# Patient Record
Sex: FEMALE | Race: WHITE | NOT HISPANIC OR LATINO | Employment: UNEMPLOYED | URBAN - METROPOLITAN AREA
[De-identification: names, ages, dates, MRNs, and addresses within clinical notes are randomized per-mention and may not be internally consistent; named-entity substitution may affect disease eponyms.]

---

## 2019-02-14 ENCOUNTER — OFFICE VISIT (OUTPATIENT)
Dept: URGENT CARE | Facility: CLINIC | Age: 34
End: 2019-02-14
Payer: COMMERCIAL

## 2019-02-14 VITALS
TEMPERATURE: 98.4 F | DIASTOLIC BLOOD PRESSURE: 80 MMHG | BODY MASS INDEX: 41.95 KG/M2 | HEART RATE: 80 BPM | SYSTOLIC BLOOD PRESSURE: 118 MMHG | RESPIRATION RATE: 20 BRPM | HEIGHT: 70 IN | WEIGHT: 293 LBS

## 2019-02-14 DIAGNOSIS — R51.9 ACUTE INTRACTABLE HEADACHE, UNSPECIFIED HEADACHE TYPE: Primary | ICD-10-CM

## 2019-02-14 PROCEDURE — 99203 OFFICE O/P NEW LOW 30 MIN: CPT | Performed by: FAMILY MEDICINE

## 2019-02-14 PROCEDURE — G0382 LEV 3 HOSP TYPE B ED VISIT: HCPCS | Performed by: FAMILY MEDICINE

## 2019-02-14 PROCEDURE — 99283 EMERGENCY DEPT VISIT LOW MDM: CPT | Performed by: FAMILY MEDICINE

## 2019-02-14 PROCEDURE — 96372 THER/PROPH/DIAG INJ SC/IM: CPT | Performed by: FAMILY MEDICINE

## 2019-02-14 RX ORDER — GABAPENTIN 600 MG/1
600 TABLET ORAL
Refills: 5 | COMMUNITY
Start: 2019-01-14

## 2019-02-14 RX ORDER — METOPROLOL TARTRATE 50 MG/1
50 TABLET, FILM COATED ORAL EVERY 12 HOURS SCHEDULED
Refills: 3 | COMMUNITY
Start: 2019-02-07

## 2019-02-14 RX ORDER — KETOROLAC TROMETHAMINE 30 MG/ML
60 INJECTION, SOLUTION INTRAMUSCULAR; INTRAVENOUS ONCE
Status: COMPLETED | OUTPATIENT
Start: 2019-02-14 | End: 2019-02-14

## 2019-02-14 RX ORDER — OXYCODONE HYDROCHLORIDE 10 MG/1
10 TABLET ORAL
Refills: 0 | COMMUNITY
Start: 2019-02-08

## 2019-02-14 RX ADMIN — KETOROLAC TROMETHAMINE 60 MG: 30 INJECTION, SOLUTION INTRAMUSCULAR; INTRAVENOUS at 20:15

## 2019-02-15 NOTE — PATIENT INSTRUCTIONS
If no improvement in symptoms, follow up with the emergency room  Follow up with PCP in 3-5 days  Proceed to  ER if symptoms worsen

## 2019-02-15 NOTE — PROGRESS NOTES
[facility] [place] [facility]        NAME: Judy Mulligan is a 27 y o  female  : 1985    MRN: [id number]  DATE: 2019  TIME: 8:10 PM    Assessment and Plan   Acute intractable headache, unspecified headache type [R51]  1  Acute intractable headache, unspecified headache type  ketorolac (TORADOL) injection 60 mg         Patient Instructions     If no improvement in symptoms, follow up with the emergency room  Follow up with PCP in 3-5 days  Proceed to  ER if symptoms worsen  Chief Complaint     Chief Complaint   Patient presents with    Migraine     for 1 day         History of Present Illness       Patient with a typical migraine symptoms  Usually resolved with ibuprofen and rest   This has been going on for 1 day she has had some nausea but no vomiting  Pain is localized to the right side behind the eye  This is not the worst headache the patient has had no life  Review of Systems   Review of Systems   Constitutional: Negative  Respiratory: Negative  Cardiovascular: Negative  Neurological: Positive for headaches  Current Medications       Current Outpatient Medications:     gabapentin (NEURONTIN) 600 MG tablet, , Disp: , Rfl: 5    metoprolol tartrate (LOPRESSOR) 50 mg tablet, , Disp: , Rfl: 3    oxyCODONE (ROXICODONE) 10 MG TABS, , Disp: , Rfl: 0    Current Facility-Administered Medications:     ketorolac (TORADOL) injection 60 mg, 60 mg, Intramuscular, Once, [employer], DO    Current Allergies     Allergies as of 2019    (No Known Allergies)            The following portions of the patient's history were reviewed and updated as appropriate: allergies, current medications, past family history, past medical history, past social history, past surgical history and problem list      History reviewed  No pertinent past medical history  History reviewed  No pertinent surgical history  History reviewed  No pertinent family history        Medications have been verified  Objective   /80   Pulse 80   Temp 98 4 °F (36 9 °C) (Tympanic)   Resp 20   Ht 5' 10" (1 778 m)   Wt (!) 163 kg (360 lb)   BMI 51 65 kg/m²        Physical Exam     Physical Exam   Constitutional: She is oriented to person, place, and time  She appears well-developed and well-nourished  Cardiovascular: Normal rate and regular rhythm  Pulmonary/Chest: Effort normal and breath sounds normal    Neurological: She is alert and oriented to person, place, and time  No cranial nerve deficit or sensory deficit  She exhibits normal muscle tone   Coordination normal

## 2019-02-17 ENCOUNTER — HOSPITAL ENCOUNTER (EMERGENCY)
Facility: HOSPITAL | Age: 34
Discharge: HOME | End: 2019-02-17
Attending: EMERGENCY MEDICINE | Admitting: EMERGENCY MEDICINE
Payer: COMMERCIAL

## 2019-02-17 ENCOUNTER — APPOINTMENT (EMERGENCY)
Dept: CT IMAGING | Facility: HOSPITAL | Age: 34
End: 2019-02-17
Payer: COMMERCIAL

## 2019-02-17 VITALS
SYSTOLIC BLOOD PRESSURE: 189 MMHG | TEMPERATURE: 98.6 F | WEIGHT: 293 LBS | OXYGEN SATURATION: 94 % | DIASTOLIC BLOOD PRESSURE: 104 MMHG | HEART RATE: 70 BPM | BODY MASS INDEX: 51.65 KG/M2 | RESPIRATION RATE: 16 BRPM

## 2019-02-17 DIAGNOSIS — H53.9 VISUAL DISTURBANCE: Primary | ICD-10-CM

## 2019-02-17 DIAGNOSIS — G43.809 OTHER MIGRAINE WITHOUT STATUS MIGRAINOSUS, NOT INTRACTABLE: ICD-10-CM

## 2019-02-17 LAB — EXT PREG TEST URINE: NEGATIVE

## 2019-02-17 PROCEDURE — 99284 EMERGENCY DEPT VISIT MOD MDM: CPT

## 2019-02-17 PROCEDURE — 70450 CT HEAD/BRAIN W/O DYE: CPT

## 2019-02-17 PROCEDURE — 81025 URINE PREGNANCY TEST: CPT | Performed by: EMERGENCY MEDICINE

## 2019-02-17 PROCEDURE — 96372 THER/PROPH/DIAG INJ SC/IM: CPT

## 2019-02-17 RX ORDER — ACETAMINOPHEN 325 MG/1
975 TABLET ORAL ONCE
Status: COMPLETED | OUTPATIENT
Start: 2019-02-17 | End: 2019-02-17

## 2019-02-17 RX ORDER — KETOROLAC TROMETHAMINE 30 MG/ML
30 INJECTION, SOLUTION INTRAMUSCULAR; INTRAVENOUS ONCE
Status: COMPLETED | OUTPATIENT
Start: 2019-02-17 | End: 2019-02-17

## 2019-02-17 RX ORDER — ONDANSETRON 4 MG/1
4 TABLET, ORALLY DISINTEGRATING ORAL ONCE
Status: COMPLETED | OUTPATIENT
Start: 2019-02-17 | End: 2019-02-17

## 2019-02-17 RX ADMIN — ACETAMINOPHEN 975 MG: 325 TABLET ORAL at 02:00

## 2019-02-17 RX ADMIN — METOPROLOL TARTRATE 50 MG: 25 TABLET ORAL at 01:12

## 2019-02-17 RX ADMIN — KETOROLAC TROMETHAMINE 30 MG: 30 INJECTION, SOLUTION INTRAMUSCULAR; INTRAVENOUS at 02:02

## 2019-02-17 RX ADMIN — ONDANSETRON 4 MG: 4 TABLET, ORALLY DISINTEGRATING ORAL at 02:02

## 2019-02-17 NOTE — ED PROVIDER NOTES
History  Chief Complaint   Patient presents with    Visual Field Change     migraine     76-year-old female with history of hypertension and herniated disc with drop foot presents for evaluation of migraine headache and visual disturbance x4 days  Patient reports headache which is bifrontal nonradiating and currently mild in nature  Patient seen in urgent care and instructed to follow up as an outpatient if continued symptoms  Patient reports intermittent headache and continued photophobia and mild visual disturbance  Patient denies fevers, chills, neck pain, abdominal pain, paresthesias, focal neurologic deficits and slurred speech  History provided by:  Patient   used: No        Prior to Admission Medications   Prescriptions Last Dose Informant Patient Reported? Taking?   gabapentin (NEURONTIN) 600 MG tablet   Yes No   Si mg daily at bedtime    metoprolol tartrate (LOPRESSOR) 50 mg tablet   Yes No   Si mg every 12 (twelve) hours    oxyCODONE (ROXICODONE) 10 MG TABS   Yes No   Sig: Take 10 mg by mouth daily at bedtime       Facility-Administered Medications: None       History reviewed  No pertinent past medical history  Past Surgical History:   Procedure Laterality Date    HIP SURGERY         History reviewed  No pertinent family history  I have reviewed and agree with the history as documented  Social History     Tobacco Use    Smoking status: Current Every Day Smoker     Packs/day: 1 00    Smokeless tobacco: Never Used   Substance Use Topics    Alcohol use: Never     Frequency: Never    Drug use: Yes     Types: Marijuana        Review of Systems   Constitutional: Negative for chills and fever  HENT: Negative for rhinorrhea and sore throat  Eyes: Positive for photophobia and visual disturbance  Respiratory: Negative for cough and shortness of breath  Cardiovascular: Negative for chest pain and leg swelling     Gastrointestinal: Negative for abdominal pain, diarrhea, nausea and vomiting  Genitourinary: Negative for dysuria  Musculoskeletal: Negative for back pain and myalgias  Skin: Negative for rash  Neurological: Negative for dizziness and headaches  Psychiatric/Behavioral: Negative for confusion  All other systems reviewed and are negative  Physical Exam  Physical Exam   Constitutional: She is oriented to person, place, and time  She appears well-developed and well-nourished  HENT:   Nose: Nose normal    Mouth/Throat: Oropharynx is clear and moist  No oropharyngeal exudate  Eyes: Pupils are equal, round, and reactive to light  Conjunctivae and EOM are normal  Right eye exhibits no chemosis and no discharge  Left eye exhibits no chemosis and no discharge  No scleral icterus  Neck: Normal range of motion  Neck supple  No JVD present  No tracheal deviation present  Cardiovascular: Normal rate, regular rhythm and normal heart sounds  No murmur heard  Pulmonary/Chest: Effort normal and breath sounds normal  No respiratory distress  She has no wheezes  She has no rales  Abdominal: Soft  Bowel sounds are normal  There is no tenderness  There is no guarding  Musculoskeletal: Normal range of motion  She exhibits no edema or tenderness  Neurological: She is alert and oriented to person, place, and time  No cranial nerve deficit or sensory deficit  She exhibits normal muscle tone  5/5 motor, nl sens   Skin: Skin is warm and dry  Psychiatric: She has a normal mood and affect  Her behavior is normal    Nursing note and vitals reviewed        Vital Signs  ED Triage Vitals [02/17/19 0019]   Temperature Pulse Respirations Blood Pressure SpO2   98 6 °F (37 °C) 77 18 (!) 190/109 94 %      Temp src Heart Rate Source Patient Position - Orthostatic VS BP Location FiO2 (%)   -- -- -- -- --      Pain Score       No Pain           Vitals:    02/17/19 0019 02/17/19 0136   BP: (!) 190/109 (!) 189/104   Pulse: 77 70       Visual Acuity  Visual Acuity Most Recent Value   Visual acuity R eye is  20/25   Visual acuity Left eye is  20/20   Visual acuity in both eyes is  20/20   Wearing corrective eyewear/lenses? Yes   L Pupil Size (mm)  3   R Pupil Size (mm)  3          ED Medications  Medications   metoprolol tartrate (LOPRESSOR) tablet 50 mg (50 mg Oral Given 2/17/19 0112)   ketorolac (TORADOL) injection 30 mg (30 mg Intramuscular Given 2/17/19 0202)   ondansetron (ZOFRAN-ODT) dispersible tablet 4 mg (4 mg Oral Given 2/17/19 0202)   acetaminophen (TYLENOL) tablet 975 mg (975 mg Oral Given 2/17/19 0200)       Diagnostic Studies  Results Reviewed     Procedure Component Value Units Date/Time    POCT pregnancy, urine [[id number]]  (Normal) Resulted:  02/17/19 0107    Lab Status:  Final result Updated:  02/17/19 0107     EXT PREG TEST UR (Ref: Negative) negative                 CT head without contrast   Final Result by Madiha Barrera DO (02/17 0132)      No acute intracranial abnormality  Workstation performed: [id number]                    Procedures  Procedures       Phone Contacts  ED Phone Contact    ED Course  ED Course as of Feb 17 0225   Brandie Ortega Feb 17, 2019   9724 Patient seen examined at bedside  CT head ordered      0112 Patient given home dose of metoprolol tartrate 50 mg        0132 Imaging reviewed, no acute pathology noted  Discussed with patient results of imaging and plan for Toradol, Tylenol and Zofran  0216 Visual acuity with 20/20 vision left eye and 20/25 vision right eye  Discussed with patient results of imaging and plan for discharge home  Patient will need to follow up with PMD as an outpatient and Ophthalmology  Patient will likely need outpatient MRI to be ordered by PMD   Patient to return to ED if any change or worsening condition              MDM    Disposition  Final diagnoses:   Visual disturbance   Other migraine without status migrainosus, not intractable     Time reflects when diagnosis was documented in both MDM as applicable and the Disposition within this note     Time User Action Codes Description Comment    2/17/2019  2:15 AM Magy Ashton Add [H53 9] Visual disturbance     2/17/2019  2:15 AM Cyndie Mcgill Add [G43 809] Other migraine without status migrainosus, not intractable       ED Disposition     ED Disposition Condition Date/Time Comment    Discharge Stable Sun Feb 17, 2019  2:15 AM Freya Alvarez discharge to home/self care  Follow-up Information     Follow up With Specialties Details Why Contact Info    SG Barfield Nurse Practitioner In 2 days  [address] [facility] [address]  Emergency Department Emergency Medicine  As needed, If symptoms worsen [address] [facility] [address] [phone]          Discharge Medication List as of 2/17/2019  2:16 AM      CONTINUE these medications which have NOT CHANGED    Details   gabapentin (NEURONTIN) 600 MG tablet 600 mg daily at bedtime , Starting Mon 1/14/2019, Historical Med      metoprolol tartrate (LOPRESSOR) 50 mg tablet 50 mg every 12 (twelve) hours , Starting Thu 2/7/2019, Historical Med      oxyCODONE (ROXICODONE) 10 MG TABS Take 10 mg by mouth daily at bedtime , Starting Fri 2/8/2019, Historical Med           No discharge procedures on file      ED Provider  Electronically Signed by           Hiro Gallegos DO  02/17/19 6992

## 2019-02-17 NOTE — ED NOTES
Pt  Seen at [facility] for migraine, has been having episodes of visual clarity in flashes       Donnie Penaloza RN  06/21/36 9868

## 2021-11-22 ENCOUNTER — HOSPITAL ENCOUNTER (EMERGENCY)
Facility: HOSPITAL | Age: 36
Discharge: HOME | End: 2021-11-22
Attending: EMERGENCY MEDICINE
Payer: MEDICARE

## 2021-11-22 ENCOUNTER — APPOINTMENT (EMERGENCY)
Dept: RADIOLOGY | Facility: HOSPITAL | Age: 36
End: 2021-11-22
Payer: MEDICARE

## 2021-11-22 VITALS
SYSTOLIC BLOOD PRESSURE: 196 MMHG | RESPIRATION RATE: 16 BRPM | HEIGHT: 70 IN | WEIGHT: 293 LBS | HEART RATE: 85 BPM | BODY MASS INDEX: 41.95 KG/M2 | DIASTOLIC BLOOD PRESSURE: 106 MMHG | TEMPERATURE: 98.4 F | OXYGEN SATURATION: 100 %

## 2021-11-22 DIAGNOSIS — S82.401A CLOSED FRACTURE OF BOTH FIBULAE, INITIAL ENCOUNTER: Primary | ICD-10-CM

## 2021-11-22 DIAGNOSIS — S82.402A CLOSED FRACTURE OF BOTH FIBULAE, INITIAL ENCOUNTER: Primary | ICD-10-CM

## 2021-11-22 PROCEDURE — 29515 APPLICATION SHORT LEG SPLINT: CPT | Performed by: EMERGENCY MEDICINE

## 2021-11-22 PROCEDURE — 73610 X-RAY EXAM OF ANKLE: CPT

## 2021-11-22 PROCEDURE — 99283 EMERGENCY DEPT VISIT LOW MDM: CPT

## 2021-11-22 PROCEDURE — 99285 EMERGENCY DEPT VISIT HI MDM: CPT | Performed by: EMERGENCY MEDICINE

## 2021-11-22 PROCEDURE — 96372 THER/PROPH/DIAG INJ SC/IM: CPT

## 2021-11-22 RX ORDER — HYDROMORPHONE HCL/PF 1 MG/ML
2 SYRINGE (ML) INJECTION ONCE
Status: COMPLETED | OUTPATIENT
Start: 2021-11-22 | End: 2021-11-22

## 2021-11-22 RX ORDER — OXYCODONE AND ACETAMINOPHEN 10; 325 MG/1; MG/1
1 TABLET ORAL EVERY 4 HOURS PRN
Qty: 8 TABLET | Refills: 0 | Status: SHIPPED | OUTPATIENT
Start: 2021-11-22

## 2021-11-22 RX ADMIN — HYDROMORPHONE HYDROCHLORIDE 2 MG: 1 INJECTION, SOLUTION INTRAMUSCULAR; INTRAVENOUS; SUBCUTANEOUS at 05:07

## 2021-12-07 ENCOUNTER — APPOINTMENT (OUTPATIENT)
Dept: LAB | Facility: HOSPITAL | Age: 36
End: 2021-12-07
Payer: MEDICARE

## 2021-12-07 DIAGNOSIS — I10 HYPERTENSION, UNSPECIFIED TYPE: ICD-10-CM

## 2021-12-07 DIAGNOSIS — E66.01 MORBID OBESITY (HCC): ICD-10-CM

## 2021-12-07 DIAGNOSIS — R53.83 FATIGUE, UNSPECIFIED TYPE: ICD-10-CM

## 2021-12-07 LAB
ALBUMIN SERPL BCP-MCNC: 2.6 G/DL (ref 3.5–5)
ALP SERPL-CCNC: 128 U/L (ref 46–116)
ALT SERPL W P-5'-P-CCNC: 15 U/L (ref 12–78)
ANION GAP SERPL CALCULATED.3IONS-SCNC: 7 MMOL/L (ref 4–13)
AST SERPL W P-5'-P-CCNC: 10 U/L (ref 5–45)
BILIRUB SERPL-MCNC: 0.27 MG/DL (ref 0.2–1)
BUN SERPL-MCNC: 10 MG/DL (ref 5–25)
CALCIUM ALBUM COR SERPL-MCNC: 9.5 MG/DL (ref 8.3–10.1)
CALCIUM SERPL-MCNC: 8.4 MG/DL (ref 8.3–10.1)
CHLORIDE SERPL-SCNC: 102 MMOL/L (ref 100–108)
CHOLEST SERPL-MCNC: 191 MG/DL
CO2 SERPL-SCNC: 29 MMOL/L (ref 21–32)
CREAT SERPL-MCNC: 1.16 MG/DL (ref 0.6–1.3)
GFR SERPL CREATININE-BSD FRML MDRD: 60 ML/MIN/1.73SQ M
GLUCOSE P FAST SERPL-MCNC: 169 MG/DL (ref 65–99)
HDLC SERPL-MCNC: 28 MG/DL
LDLC SERPL CALC-MCNC: 142 MG/DL (ref 0–100)
NONHDLC SERPL-MCNC: 163 MG/DL
POTASSIUM SERPL-SCNC: 3.7 MMOL/L (ref 3.5–5.3)
PROT SERPL-MCNC: 7.5 G/DL (ref 6.4–8.2)
SODIUM SERPL-SCNC: 138 MMOL/L (ref 136–145)
TRIGL SERPL-MCNC: 106 MG/DL
TSH SERPL DL<=0.05 MIU/L-ACNC: 3.95 UIU/ML (ref 0.36–3.74)

## 2021-12-07 PROCEDURE — 80053 COMPREHEN METABOLIC PANEL: CPT

## 2021-12-07 PROCEDURE — 36415 COLL VENOUS BLD VENIPUNCTURE: CPT

## 2021-12-07 PROCEDURE — 84443 ASSAY THYROID STIM HORMONE: CPT

## 2021-12-07 PROCEDURE — 80061 LIPID PANEL: CPT

## 2021-12-07 PROCEDURE — 83036 HEMOGLOBIN GLYCOSYLATED A1C: CPT

## 2021-12-07 PROCEDURE — 85025 COMPLETE CBC W/AUTO DIFF WBC: CPT

## 2021-12-08 LAB
BASOPHILS # BLD AUTO: 0.09 THOUSANDS/ΜL (ref 0–0.1)
BASOPHILS NFR BLD AUTO: 1 % (ref 0–1)
EOSINOPHIL # BLD AUTO: 0.53 THOUSAND/ΜL (ref 0–0.61)
EOSINOPHIL NFR BLD AUTO: 4 % (ref 0–6)
ERYTHROCYTE [DISTWIDTH] IN BLOOD BY AUTOMATED COUNT: 13.5 % (ref 11.6–15.1)
EST. AVERAGE GLUCOSE BLD GHB EST-MCNC: 183 MG/DL
HBA1C MFR BLD: 8 %
HCT VFR BLD AUTO: 50.5 % (ref 34.8–46.1)
HGB BLD-MCNC: 15 G/DL (ref 11.5–15.4)
IMM GRANULOCYTES # BLD AUTO: 0.04 THOUSAND/UL (ref 0–0.2)
IMM GRANULOCYTES NFR BLD AUTO: 0 % (ref 0–2)
LYMPHOCYTES # BLD AUTO: 2.87 THOUSANDS/ΜL (ref 0.6–4.47)
LYMPHOCYTES NFR BLD AUTO: 24 % (ref 14–44)
MCH RBC QN AUTO: 28.1 PG (ref 26.8–34.3)
MCHC RBC AUTO-ENTMCNC: 29.7 G/DL (ref 31.4–37.4)
MCV RBC AUTO: 95 FL (ref 82–98)
MONOCYTES # BLD AUTO: 0.45 THOUSAND/ΜL (ref 0.17–1.22)
MONOCYTES NFR BLD AUTO: 4 % (ref 4–12)
NEUTROPHILS # BLD AUTO: 8.19 THOUSANDS/ΜL (ref 1.85–7.62)
NEUTS SEG NFR BLD AUTO: 67 % (ref 43–75)
NRBC BLD AUTO-RTO: 0 /100 WBCS
PLATELET # BLD AUTO: 485 THOUSANDS/UL (ref 149–390)
PMV BLD AUTO: 11.2 FL (ref 8.9–12.7)
RBC # BLD AUTO: 5.34 MILLION/UL (ref 3.81–5.12)
WBC # BLD AUTO: 12.17 THOUSAND/UL (ref 4.31–10.16)

## 2022-03-18 ENCOUNTER — APPOINTMENT (OUTPATIENT)
Dept: LAB | Facility: HOSPITAL | Age: 37
End: 2022-03-18
Payer: MEDICARE

## 2022-03-18 DIAGNOSIS — I51.9 MYXEDEMA HEART DISEASE: ICD-10-CM

## 2022-03-18 DIAGNOSIS — I10 HYPERTENSION, UNSPECIFIED TYPE: ICD-10-CM

## 2022-03-18 DIAGNOSIS — E03.9 MYXEDEMA HEART DISEASE: ICD-10-CM

## 2022-03-18 LAB
ALBUMIN SERPL BCP-MCNC: 3.1 G/DL (ref 3.5–5)
ALP SERPL-CCNC: 119 U/L (ref 46–116)
ALT SERPL W P-5'-P-CCNC: 23 U/L (ref 12–78)
ANION GAP SERPL CALCULATED.3IONS-SCNC: 4 MMOL/L (ref 4–13)
AST SERPL W P-5'-P-CCNC: 10 U/L (ref 5–45)
BILIRUB SERPL-MCNC: 0.32 MG/DL (ref 0.2–1)
BUN SERPL-MCNC: 13 MG/DL (ref 5–25)
CALCIUM ALBUM COR SERPL-MCNC: 9.4 MG/DL (ref 8.3–10.1)
CALCIUM SERPL-MCNC: 8.7 MG/DL (ref 8.3–10.1)
CHLORIDE SERPL-SCNC: 106 MMOL/L (ref 100–108)
CO2 SERPL-SCNC: 27 MMOL/L (ref 21–32)
CREAT SERPL-MCNC: 1.1 MG/DL (ref 0.6–1.3)
EST. AVERAGE GLUCOSE BLD GHB EST-MCNC: 157 MG/DL
GFR SERPL CREATININE-BSD FRML MDRD: 63 ML/MIN/1.73SQ M
GLUCOSE P FAST SERPL-MCNC: 134 MG/DL (ref 65–99)
HBA1C MFR BLD: 7.1 %
LDLC SERPL DIRECT ASSAY-MCNC: 176 MG/DL (ref 0–100)
POTASSIUM SERPL-SCNC: 4.5 MMOL/L (ref 3.5–5.3)
PROT SERPL-MCNC: 7.4 G/DL (ref 6.4–8.2)
SODIUM SERPL-SCNC: 137 MMOL/L (ref 136–145)
TSH SERPL DL<=0.05 MIU/L-ACNC: 5.32 UIU/ML (ref 0.36–3.74)

## 2022-03-18 PROCEDURE — 83036 HEMOGLOBIN GLYCOSYLATED A1C: CPT

## 2022-03-18 PROCEDURE — 83721 ASSAY OF BLOOD LIPOPROTEIN: CPT

## 2022-03-18 PROCEDURE — 84443 ASSAY THYROID STIM HORMONE: CPT

## 2022-03-18 PROCEDURE — 80053 COMPREHEN METABOLIC PANEL: CPT

## 2022-03-18 PROCEDURE — 36415 COLL VENOUS BLD VENIPUNCTURE: CPT

## 2023-02-14 ENCOUNTER — OFFICE VISIT (OUTPATIENT)
Dept: FAMILY MEDICINE CLINIC | Facility: CLINIC | Age: 38
End: 2023-02-14

## 2023-02-14 VITALS
OXYGEN SATURATION: 92 % | HEART RATE: 115 BPM | WEIGHT: 293 LBS | BODY MASS INDEX: 41.95 KG/M2 | HEIGHT: 70 IN | DIASTOLIC BLOOD PRESSURE: 72 MMHG | TEMPERATURE: 98.2 F | SYSTOLIC BLOOD PRESSURE: 90 MMHG

## 2023-02-14 DIAGNOSIS — Z23 ENCOUNTER FOR IMMUNIZATION: ICD-10-CM

## 2023-02-14 DIAGNOSIS — I73.9 ARTERIAL INSUFFICIENCY OF LOWER EXTREMITY (HCC): ICD-10-CM

## 2023-02-14 DIAGNOSIS — F12.90 MARIJUANA USE: ICD-10-CM

## 2023-02-14 DIAGNOSIS — Z79.4 TYPE 2 DIABETES MELLITUS WITH DIABETIC NEUROPATHY, WITH LONG-TERM CURRENT USE OF INSULIN (HCC): ICD-10-CM

## 2023-02-14 DIAGNOSIS — I10 PRIMARY HYPERTENSION: ICD-10-CM

## 2023-02-14 DIAGNOSIS — L02.212 ABSCESS OF BACK: ICD-10-CM

## 2023-02-14 DIAGNOSIS — Z11.4 SCREENING FOR HIV (HUMAN IMMUNODEFICIENCY VIRUS): ICD-10-CM

## 2023-02-14 DIAGNOSIS — E03.9 HYPOTHYROIDISM, UNSPECIFIED TYPE: ICD-10-CM

## 2023-02-14 DIAGNOSIS — E66.01 CLASS 3 SEVERE OBESITY DUE TO EXCESS CALORIES WITH SERIOUS COMORBIDITY AND BODY MASS INDEX (BMI) OF 50.0 TO 59.9 IN ADULT (HCC): ICD-10-CM

## 2023-02-14 DIAGNOSIS — N18.4 STAGE 4 CHRONIC KIDNEY DISEASE (HCC): ICD-10-CM

## 2023-02-14 DIAGNOSIS — E11.40 TYPE 2 DIABETES MELLITUS WITH DIABETIC NEUROPATHY, WITH LONG-TERM CURRENT USE OF INSULIN (HCC): ICD-10-CM

## 2023-02-14 DIAGNOSIS — Z11.59 NEED FOR HEPATITIS C SCREENING TEST: ICD-10-CM

## 2023-02-14 DIAGNOSIS — E78.00 PURE HYPERCHOLESTEROLEMIA: ICD-10-CM

## 2023-02-14 DIAGNOSIS — Z76.89 ENCOUNTER TO ESTABLISH CARE WITH NEW DOCTOR: Primary | ICD-10-CM

## 2023-02-14 RX ORDER — INSULIN GLARGINE 100 [IU]/ML
30 INJECTION, SOLUTION SUBCUTANEOUS DAILY
Qty: 9 ML | Refills: 0 | Status: SHIPPED | OUTPATIENT
Start: 2023-02-14 | End: 2023-03-16

## 2023-02-14 RX ORDER — ISOPROPYL ALCOHOL 70 ML/100ML
SWAB TOPICAL
COMMUNITY
Start: 2023-01-10

## 2023-02-14 RX ORDER — METFORMIN HYDROCHLORIDE 500 MG/1
1000 TABLET, EXTENDED RELEASE ORAL
COMMUNITY
Start: 2022-12-29 | End: 2023-02-18

## 2023-02-14 RX ORDER — INSULIN GLARGINE 100 [IU]/ML
30 INJECTION, SOLUTION SUBCUTANEOUS DAILY
COMMUNITY
Start: 2022-12-08 | End: 2023-02-14 | Stop reason: SDUPTHER

## 2023-02-14 RX ORDER — VALSARTAN AND HYDROCHLOROTHIAZIDE 320; 25 MG/1; MG/1
TABLET, FILM COATED ORAL
COMMUNITY
Start: 2022-12-29 | End: 2023-02-18

## 2023-02-14 RX ORDER — AMLODIPINE BESYLATE 2.5 MG/1
7.5 TABLET ORAL DAILY
Qty: 90 TABLET | Refills: 0 | Status: SHIPPED | OUTPATIENT
Start: 2023-02-14 | End: 2023-02-18

## 2023-02-14 RX ORDER — AMLODIPINE BESYLATE 10 MG/1
TABLET ORAL
COMMUNITY
Start: 2023-01-06 | End: 2023-02-14

## 2023-02-14 RX ORDER — INSULIN ASPART 100 [IU]/ML
INJECTION, SOLUTION INTRAVENOUS; SUBCUTANEOUS
COMMUNITY
Start: 2023-01-27

## 2023-02-14 RX ORDER — CLINDAMYCIN HYDROCHLORIDE 300 MG/1
300 CAPSULE ORAL 4 TIMES DAILY
Qty: 28 CAPSULE | Refills: 0 | Status: SHIPPED | OUTPATIENT
Start: 2023-02-14 | End: 2023-02-18

## 2023-02-14 NOTE — PROGRESS NOTES
Assessment/Plan:      Diagnoses and all orders for this visit:    Encounter to establish care with new doctor    Need for hepatitis C screening test  -     Hepatitis C Antibody ([facility]); Future    Encounter for immunization  -     Cancel: Pneumococcal Conjugate Vaccine 20-valent (PCV20)  -     Cancel: influenza vaccine, quadrivalent, 0 5 mL, preservative-free, for adult and pediatric patients 6 mos+ (AFLURIA, FLUARIX, FLULAVAL, FLUZONE)    Screening for HIV (human immunodeficiency virus)  -     HIV 1/2 AG/AB w Reflex [facility] for 2 yr old and above; Future    Stage 4 chronic kidney disease (HCC)  -     Comprehensive metabolic panel; Future    Pure hypercholesterolemia  -     Lipid panel; Future    Type 2 diabetes mellitus with diabetic neuropathy, with long-term current use of insulin (HCC)  -     Comprehensive metabolic panel; Future  -     Lipid panel; Future  -     Insulin Glargine Solostar (Lantus SoloStar) 100 UNIT/ML SOPN; Inject 0 3 mL (30 Units total) under the skin daily    Hypothyroidism, unspecified type  -     TSH, 3rd generation with Free T4 reflex; Future    Primary hypertension  -     amLODIPine (NORVASC) 2 5 mg tablet; Take 3 tablets (7 5 mg total) by mouth daily    Arterial insufficiency of lower extremity (HCC)    Class 3 severe obesity due to excess calories with serious comorbidity and body mass index (BMI) of 50 0 to 59 9 in adult [facility])    Abscess of back  -     CBC and differential; Future  -     Ambulatory Referral to General Surgery; Future  -     clindamycin (CLEOCIN) 300 MG capsule;  Take 1 capsule (300 mg total) by mouth 4 (four) times a day for 7 days    Marijuana use    Other orders  -     glucose blood test strip; e11 65 (Type 2 Diabetes) Test daily before meals 3 times a day  -     GNP Alcohol Swabs 70 % PADS  -     Discontinue: amLODIPine (NORVASC) 10 mg tablet  -     Discontinue: Insulin Glargine Solostar (Lantus SoloStar) 100 UNIT/ML SOPN; Inject 30 Units under the skin daily  -     insulin aspart (NovoLOG FlexPen ReliOn) 100 UNIT/ML injection pen; TAKE EXTRA INSULIN BASED ON BG RESULTS PER SCALE PROVIDED (ISF 10-80 MG/DL OPTIONS)  E11 65 (TYPE 2 DIABETES) -200 = 1 UNIT HUMALOG -250 =<MORE>  -     metFORMIN (GLUCOPHAGE-XR) 500 mg 24 hr tablet; Take 1,000 mg by mouth  -     valsartan-hydrochlorothiazide (DIOVAN-HCT) 320-25 MG per tablet          Return in about 1 week (around 2/21/2023) for follow up back  The following portions of the patient's history were reviewed and updated as appropriate: allergies, current medications, past family history, past medical history, past social history, past surgical history, and problem list      Subjective:     Patient ID: Xavier Hearn is a 43 y o  female  HPI    Xavier Hearn presents today to establish care  Patient doing not well today  History was reviewed as below  Previous PCP with [facility]  Has not been seen for 1 year approximately  Last visit 4/19/2022 for HTN  T2DM: patient previously was only on metformin 500 x 2 (1000 mg) daily  12/6/2022 she was hospitalized and started on insulin with referral to endocrinology for diabetes management  lantus 30 units once daily  humalog TID pending scale  scale references below  Patient has pending appointment endo 3/6/2023  Gabapentin 600 mg BID due to neuropathy  Reports has been out of the lantus for 1 month so has not been taking  Reports only takes 5-7 units per meal for the last month  Reports was 1-2 units only when she was on the lantus  She does log at home and have been 300-400       Blood glucose 151-200 = Humalog 1 unit  Blood glucose 201-250 = Humalog 2 units  Blood glucose 251-300 = Humalog 3 units   Blood glucose 301-350 = Humalog 5 units  Blood glucose 351-400 = Humalog 7 units    Lab Results   Component Value Date    HGBA1C 13 2 (H) 12/07/2022   - patient sent refill of lantus 30 units  - log and bring to next visit  - I advised patient I am worried about her glucose given her machine read "high" today which likely indicated > 500    HTN: amlodipine 2 5 mg x 3 tablets (7 5 mg) daily but reports then was increased to 10 mg in January due to high blood pressures via [facility] who called her pcp, metoprolol 75 mg BID, valsartan hydrochlorothiazide 320-25 mg daily  Does not check blood pressure cuff  - decrease norvasc back to 7 5 mg   - however, given that I think this patient may be septic vs hyperglycemic with low BP advised to HOLD the amlodipine even after receiving new dose  - get BP cuff which she says her mother can get for her and monitor daily  She is asymptomatic currently from the low BP but given remainder of symptoms she needs to be monitored closely and advised any worsening she needs to go into the ED immediately    Hypothyroidism: patient reportedly on levothyroxine 25 mcg daily  Not taking as directed  Lab Results   Component Value Date    RPV7ASYCBACE 5 320 (H) 03/18/2022   - repeat TSH now    Patient reports that she has a lesion on the back for a few days now that has been draining a lot of discharge  Her mom attempted to push out the drainage but it has continued to drain  She reports was hospitalized in December for similar lesion of the scalp  On chart review she was started on bactrim  She reports this worked well  Was following with general surgery but reports it is hard for her to get in with them  Also on chart review however last creatinine 12/2022 was 3 39  does not see nephrologist  GFR 17    - sent clindamycin to pharmacy  mg x 7 days to last until next visit to determine continued length of treatment as I am suspecting that the increased creatinine could have been result of the addition of bactrim and do not want to risk it especially not having been repeated for 2 months  - Repeat CMP now  If elevated referral to nephrology  - referal to general surgery with [facility] to see if she can get in sooner    - no fluctuance felt on examination today to I&D effectively  - concerned about sepsis given infection, low BP, elevated pulse, patient was profusely sweating, some of these may be related to her extreme glucose but she was advised to go to ED but did not want to do this at this moment  - she was advised any change in health she should go immediately there for sufficient work up and monitoring    Repeat cholesterol  Elevated last check  Not on statin  Tobacco dependence: 1 ppd x 20  interest but not ready  Marijuana continuous 1-2 times daily- advised against this and to not drive or operate machinery      Phone call made to patient 2/15/2023 2:47  Patient reports she is already feeling better  She completed her labs within the last hour  She has unfortunately not been able to  her lantus yet due to not being available but reports she was called this morning and told her they have it now so she was on her way to pick it up  She reports her sugar had come down to "400" today  She reports main concern currently is the muscle weakness  I again advised given the significant elevation is sugars I am worried her electrolytes may be off and advised if worsening to go to the ED for medication to improve sugars and possibly replete any electrolytes (K) that may be off   She verbalized understanding    PHQ-9 Depression Screening    Little interest or pleasure in doing things: 1 - several days  Feeling down, depressed, or hopeless: 1 - several days          Past Medical History:   Diagnosis Date   • Diabetes mellitus, type 2 ([facility] [address] )    • Hypertension        Past Surgical History:   Procedure Laterality Date   • HIP SURGERY Left     fracture- june 2008       Family History   Problem Relation Age of Onset   • Diabetes Mother    • Other Father         aortic insufficiency       Social History     Tobacco Use   • Smoking status: Every Day     Packs/day: 1 00     Years: 20 00     Pack years: 20 00     Types: Cigarettes   • Smokeless tobacco: Never   Substance Use Topics   • Alcohol use: Never   • Drug use: Yes     Types: Marijuana      Social History     Social History Narrative    Live house- with parents        Reports has disability due to drop foot- via [facility]       Allergies   Allergen Reactions   • Wasp Venom Anxiety, Headache, Itching, Rash and Swelling       Current Outpatient Medications on File Prior to Visit   Medication Sig Dispense Refill   • gabapentin (NEURONTIN) 600 MG tablet 600 mg daily at bedtime   5   • glucose blood test strip e11 65 (Type 2 Diabetes) Test daily before meals 3 times a day     • insulin aspart (NovoLOG FlexPen ReliOn) 100 UNIT/ML injection pen TAKE EXTRA INSULIN BASED ON BG RESULTS PER SCALE PROVIDED (ISF 10-80 MG/DL OPTIONS)  E11 65 (TYPE 2 DIABETES) -200 = 1 UNIT HUMALOG -250 =<MORE>     • metFORMIN (GLUCOPHAGE-XR) 500 mg 24 hr tablet Take 1,000 mg by mouth     • metoprolol tartrate (LOPRESSOR) 50 mg tablet 50 mg every 12 (twelve) hours   3   • GNP Alcohol Swabs 70 % PADS      • valsartan-hydrochlorothiazide (DIOVAN-HCT) 320-25 MG per tablet        No current facility-administered medications on file prior to visit  Review of Systems      Objective:    Vitals:    02/14/23 1409   BP: 90/72   Pulse: (!) 115   Temp: 98 2 °F (36 8 °C)   TempSrc: Tympanic   SpO2: 92%   Weight: (!) 170 kg (374 lb)   Height: 5' 10" (1 778 m)         Physical Exam  Vitals and nursing note reviewed  Constitutional:       General: She is not in acute distress  Appearance: Normal appearance  She is not ill-appearing or toxic-appearing  Comments: Profusely sweating   HENT:      Head: Normocephalic and atraumatic  Right Ear: External ear normal       Left Ear: External ear normal    Eyes:      General: No scleral icterus  Right eye: No discharge  Left eye: No discharge  Extraocular Movements: Extraocular movements intact        Conjunctiva/sclera: Conjunctivae normal    Cardiovascular:      Rate and Rhythm: Normal rate and regular rhythm  Heart sounds: Normal heart sounds  No murmur heard  No friction rub  No gallop  Pulmonary:      Effort: Pulmonary effort is normal  No respiratory distress  Breath sounds: Normal breath sounds  No wheezing or rales  Skin:     Findings: Lesion (erythema with induration of left upper back  mild warmth to touch  slightly tenderness to palpation  i am able to express yellow discharge from two lesions in the skin but no fluctuance was felt on examination) present  Neurological:      Mental Status: She is alert

## 2023-02-14 NOTE — PATIENT INSTRUCTIONS
Hold the norvasc despite the change in your medication dose back to 7 5 mg (three 2 5 mg tablets daily)  - get a blood pressure cuff and start checking daily at home and log with your sugars    Restart the lantus 30 units nightly  - continue the mealtime insulin with the scale you were given before    Continue to monitor your sugars    Get your blood tests done tomorrow morning fasting    Call the general surgery office you saw for the last infection and see if you can get in within the week    I placed a referral to [facility] general surgery so you ever you can get in with first

## 2023-02-15 ENCOUNTER — LAB (OUTPATIENT)
Dept: LAB | Facility: HOSPITAL | Age: 38
End: 2023-02-15

## 2023-02-15 DIAGNOSIS — E11.40 TYPE 2 DIABETES MELLITUS WITH DIABETIC NEUROPATHY, WITH LONG-TERM CURRENT USE OF INSULIN (HCC): ICD-10-CM

## 2023-02-15 DIAGNOSIS — Z79.4 TYPE 2 DIABETES MELLITUS WITH DIABETIC NEUROPATHY, WITH LONG-TERM CURRENT USE OF INSULIN (HCC): ICD-10-CM

## 2023-02-15 DIAGNOSIS — L02.212 ABSCESS OF BACK: ICD-10-CM

## 2023-02-15 DIAGNOSIS — Z11.59 NEED FOR HEPATITIS C SCREENING TEST: ICD-10-CM

## 2023-02-15 DIAGNOSIS — E78.00 PURE HYPERCHOLESTEROLEMIA: ICD-10-CM

## 2023-02-15 DIAGNOSIS — Z11.4 SCREENING FOR HIV (HUMAN IMMUNODEFICIENCY VIRUS): ICD-10-CM

## 2023-02-15 DIAGNOSIS — E03.9 HYPOTHYROIDISM, UNSPECIFIED TYPE: ICD-10-CM

## 2023-02-15 DIAGNOSIS — N18.4 STAGE 4 CHRONIC KIDNEY DISEASE (HCC): ICD-10-CM

## 2023-02-15 PROBLEM — I10 PRIMARY HYPERTENSION: Status: ACTIVE | Noted: 2023-02-15

## 2023-02-15 PROBLEM — F12.90 MARIJUANA USE: Status: ACTIVE | Noted: 2023-02-15

## 2023-02-15 LAB
ALBUMIN SERPL BCP-MCNC: 3 G/DL (ref 3.5–5)
ALP SERPL-CCNC: 149 U/L (ref 34–104)
ALT SERPL W P-5'-P-CCNC: 13 U/L (ref 7–52)
ANION GAP SERPL CALCULATED.3IONS-SCNC: 15 MMOL/L (ref 4–13)
AST SERPL W P-5'-P-CCNC: 9 U/L (ref 13–39)
BASOPHILS # BLD AUTO: 0.12 THOUSANDS/ÂΜL (ref 0–0.1)
BASOPHILS NFR BLD AUTO: 1 % (ref 0–1)
BILIRUB SERPL-MCNC: 0.68 MG/DL (ref 0.2–1)
BUN SERPL-MCNC: 32 MG/DL (ref 5–25)
CALCIUM ALBUM COR SERPL-MCNC: 9.5 MG/DL (ref 8.3–10.1)
CALCIUM SERPL-MCNC: 8.7 MG/DL (ref 8.4–10.2)
CHLORIDE SERPL-SCNC: 86 MMOL/L (ref 96–108)
CHOLEST SERPL-MCNC: 136 MG/DL
CO2 SERPL-SCNC: 27 MMOL/L (ref 21–32)
CREAT SERPL-MCNC: 3.12 MG/DL (ref 0.6–1.3)
EOSINOPHIL # BLD AUTO: 0.15 THOUSAND/ÂΜL (ref 0–0.61)
EOSINOPHIL NFR BLD AUTO: 1 % (ref 0–6)
ERYTHROCYTE [DISTWIDTH] IN BLOOD BY AUTOMATED COUNT: 13.3 % (ref 11.6–15.1)
GFR SERPL CREATININE-BSD FRML MDRD: 17 ML/MIN/1.73SQ M
GLUCOSE P FAST SERPL-MCNC: 453 MG/DL (ref 65–99)
HCT VFR BLD AUTO: 39.2 % (ref 34.8–46.1)
HCV AB SER QL: NORMAL
HDLC SERPL-MCNC: 15 MG/DL
HGB BLD-MCNC: 12.5 G/DL (ref 11.5–15.4)
IMM GRANULOCYTES # BLD AUTO: 0.32 THOUSAND/UL (ref 0–0.2)
IMM GRANULOCYTES NFR BLD AUTO: 1 % (ref 0–2)
LDLC SERPL CALC-MCNC: 81 MG/DL (ref 0–100)
LYMPHOCYTES # BLD AUTO: 1.1 THOUSANDS/ÂΜL (ref 0.6–4.47)
LYMPHOCYTES NFR BLD AUTO: 4 % (ref 14–44)
MCH RBC QN AUTO: 27.8 PG (ref 26.8–34.3)
MCHC RBC AUTO-ENTMCNC: 31.9 G/DL (ref 31.4–37.4)
MCV RBC AUTO: 87 FL (ref 82–98)
MONOCYTES # BLD AUTO: 1.69 THOUSAND/ÂΜL (ref 0.17–1.22)
MONOCYTES NFR BLD AUTO: 7 % (ref 4–12)
NEUTROPHILS # BLD AUTO: 22.73 THOUSANDS/ÂΜL (ref 1.85–7.62)
NEUTS SEG NFR BLD AUTO: 86 % (ref 43–75)
NONHDLC SERPL-MCNC: 121 MG/DL
NRBC BLD AUTO-RTO: 0 /100 WBCS
PLATELET # BLD AUTO: 421 THOUSANDS/UL (ref 149–390)
PMV BLD AUTO: 10.7 FL (ref 8.9–12.7)
POTASSIUM SERPL-SCNC: 4.2 MMOL/L (ref 3.5–5.3)
PROT SERPL-MCNC: 7.5 G/DL (ref 6.4–8.4)
RBC # BLD AUTO: 4.5 MILLION/UL (ref 3.81–5.12)
SODIUM SERPL-SCNC: 128 MMOL/L (ref 135–147)
TRIGL SERPL-MCNC: 199 MG/DL
TSH SERPL DL<=0.05 MIU/L-ACNC: 3.52 UIU/ML (ref 0.45–4.5)
WBC # BLD AUTO: 26.11 THOUSAND/UL (ref 4.31–10.16)

## 2023-02-16 LAB
HIV 1+2 AB+HIV1 P24 AG SERPL QL IA: NORMAL
HIV 2 AB SERPL QL IA: NORMAL
HIV1 AB SERPL QL IA: NORMAL
HIV1 P24 AG SERPL QL IA: NORMAL

## 2023-02-17 ENCOUNTER — APPOINTMENT (INPATIENT)
Dept: ULTRASOUND IMAGING | Facility: HOSPITAL | Age: 38
End: 2023-02-17

## 2023-02-17 ENCOUNTER — HOSPITAL ENCOUNTER (INPATIENT)
Facility: HOSPITAL | Age: 38
LOS: 1 days | Discharge: HOME HEALTH | End: 2023-02-18
Attending: EMERGENCY MEDICINE | Admitting: INTERNAL MEDICINE

## 2023-02-17 ENCOUNTER — APPOINTMENT (INPATIENT)
Dept: NON INVASIVE DIAGNOSTICS | Facility: HOSPITAL | Age: 38
End: 2023-02-17

## 2023-02-17 DIAGNOSIS — R65.20 SEVERE SEPSIS (HCC): ICD-10-CM

## 2023-02-17 DIAGNOSIS — N17.9 ACUTE KIDNEY INJURY (HCC): ICD-10-CM

## 2023-02-17 DIAGNOSIS — A41.9 SEPSIS (HCC): ICD-10-CM

## 2023-02-17 DIAGNOSIS — L03.90 CELLULITIS: ICD-10-CM

## 2023-02-17 DIAGNOSIS — N18.31 STAGE 3A CHRONIC KIDNEY DISEASE (HCC): Chronic | ICD-10-CM

## 2023-02-17 DIAGNOSIS — I73.9 ARTERIAL INSUFFICIENCY OF LOWER EXTREMITY (HCC): ICD-10-CM

## 2023-02-17 DIAGNOSIS — E66.9 OBESITY: ICD-10-CM

## 2023-02-17 DIAGNOSIS — N17.9 ACUTE RENAL FAILURE (ARF) (HCC): Primary | ICD-10-CM

## 2023-02-17 DIAGNOSIS — A41.9 SEVERE SEPSIS (HCC): ICD-10-CM

## 2023-02-17 DIAGNOSIS — L02.212 ABSCESS OF BACK: ICD-10-CM

## 2023-02-17 DIAGNOSIS — E11.65 UNCONTROLLED DIABETES MELLITUS WITH HYPERGLYCEMIA (HCC): ICD-10-CM

## 2023-02-17 DIAGNOSIS — E83.42 HYPOMAGNESEMIA: ICD-10-CM

## 2023-02-17 PROBLEM — I10 PRIMARY HYPERTENSION: Chronic | Status: ACTIVE | Noted: 2023-02-15

## 2023-02-17 PROBLEM — E66.01 CLASS 3 SEVERE OBESITY DUE TO EXCESS CALORIES WITH SERIOUS COMORBIDITY AND BODY MASS INDEX (BMI) OF 50.0 TO 59.9 IN ADULT (HCC): Chronic | Status: ACTIVE | Noted: 2023-02-14

## 2023-02-17 PROBLEM — E87.1 HYPONATREMIA: Status: ACTIVE | Noted: 2023-02-17

## 2023-02-17 PROBLEM — E11.40 TYPE 2 DIABETES MELLITUS WITH DIABETIC NEUROPATHY, WITH LONG-TERM CURRENT USE OF INSULIN (HCC): Chronic | Status: ACTIVE | Noted: 2023-02-15

## 2023-02-17 PROBLEM — Z79.4 TYPE 2 DIABETES MELLITUS WITH DIABETIC NEUROPATHY, WITH LONG-TERM CURRENT USE OF INSULIN (HCC): Chronic | Status: ACTIVE | Noted: 2023-02-15

## 2023-02-17 PROBLEM — E87.20 LACTIC ACIDOSIS: Status: ACTIVE | Noted: 2023-02-17

## 2023-02-17 PROBLEM — E03.9 HYPOTHYROIDISM: Chronic | Status: ACTIVE | Noted: 2023-02-15

## 2023-02-17 LAB
ALBUMIN SERPL BCP-MCNC: 2.7 G/DL (ref 3.5–5)
ALP SERPL-CCNC: 158 U/L (ref 34–104)
ALT SERPL W P-5'-P-CCNC: 16 U/L (ref 7–52)
ANION GAP SERPL CALCULATED.3IONS-SCNC: 12 MMOL/L (ref 4–13)
APTT PPP: 31 SECONDS (ref 23–37)
AST SERPL W P-5'-P-CCNC: 12 U/L (ref 13–39)
ATRIAL RATE: 74 BPM
BACTERIA UR QL AUTO: NORMAL /HPF
BASE EX.OXY STD BLDV CALC-SCNC: 77.9 % (ref 60–80)
BASE EXCESS BLDV CALC-SCNC: -2.1 MMOL/L
BASOPHILS # BLD AUTO: 0.09 THOUSANDS/ÂΜL (ref 0–0.1)
BASOPHILS NFR BLD AUTO: 1 % (ref 0–1)
BETA-HYDROXYBUTYRATE: 0.1 MMOL/L
BILIRUB SERPL-MCNC: 0.26 MG/DL (ref 0.2–1)
BILIRUB UR QL STRIP: NEGATIVE
BUN SERPL-MCNC: 45 MG/DL (ref 5–25)
CALCIUM ALBUM COR SERPL-MCNC: 8.8 MG/DL (ref 8.3–10.1)
CALCIUM SERPL-MCNC: 7.8 MG/DL (ref 8.4–10.2)
CHLORIDE SERPL-SCNC: 89 MMOL/L (ref 96–108)
CLARITY UR: CLEAR
CO2 SERPL-SCNC: 25 MMOL/L (ref 21–32)
COLOR UR: YELLOW
CREAT SERPL-MCNC: 3.41 MG/DL (ref 0.6–1.3)
CREAT UR-MCNC: 83 MG/DL
EOSINOPHIL # BLD AUTO: 0.25 THOUSAND/ÂΜL (ref 0–0.61)
EOSINOPHIL NFR BLD AUTO: 2 % (ref 0–6)
ERYTHROCYTE [DISTWIDTH] IN BLOOD BY AUTOMATED COUNT: 13.6 % (ref 11.6–15.1)
EXT PREGNANCY TEST URINE: NEGATIVE
EXT. CONTROL: NORMAL
GFR SERPL CREATININE-BSD FRML MDRD: 16 ML/MIN/1.73SQ M
GLUCOSE SERPL-MCNC: 221 MG/DL (ref 65–140)
GLUCOSE SERPL-MCNC: 275 MG/DL (ref 65–140)
GLUCOSE SERPL-MCNC: 317 MG/DL (ref 65–140)
GLUCOSE SERPL-MCNC: 357 MG/DL (ref 65–140)
GLUCOSE SERPL-MCNC: 362 MG/DL (ref 65–140)
GLUCOSE SERPL-MCNC: 389 MG/DL (ref 65–140)
GLUCOSE SERPL-MCNC: 519 MG/DL (ref 65–140)
GLUCOSE SERPL-MCNC: >500 MG/DL (ref 65–140)
GLUCOSE UR STRIP-MCNC: ABNORMAL MG/DL
HCO3 BLDV-SCNC: 24.7 MMOL/L (ref 24–30)
HCT VFR BLD AUTO: 34.1 % (ref 34.8–46.1)
HGB BLD-MCNC: 10.8 G/DL (ref 11.5–15.4)
HGB UR QL STRIP.AUTO: NEGATIVE
IMM GRANULOCYTES # BLD AUTO: 0.3 THOUSAND/UL (ref 0–0.2)
IMM GRANULOCYTES NFR BLD AUTO: 2 % (ref 0–2)
INR PPP: 0.99 (ref 0.84–1.19)
KETONES UR STRIP-MCNC: NEGATIVE MG/DL
LACTATE SERPL-SCNC: 2 MMOL/L (ref 0.5–2)
LACTATE SERPL-SCNC: 2.9 MMOL/L (ref 0.5–2)
LEUKOCYTE ESTERASE UR QL STRIP: NEGATIVE
LYMPHOCYTES # BLD AUTO: 2.5 THOUSANDS/ÂΜL (ref 0.6–4.47)
LYMPHOCYTES NFR BLD AUTO: 15 % (ref 14–44)
MAGNESIUM SERPL-MCNC: 1.5 MG/DL (ref 1.9–2.7)
MCH RBC QN AUTO: 27.9 PG (ref 26.8–34.3)
MCHC RBC AUTO-ENTMCNC: 31.7 G/DL (ref 31.4–37.4)
MCV RBC AUTO: 88 FL (ref 82–98)
MONOCYTES # BLD AUTO: 1.16 THOUSAND/ÂΜL (ref 0.17–1.22)
MONOCYTES NFR BLD AUTO: 7 % (ref 4–12)
NEUTROPHILS # BLD AUTO: 12.44 THOUSANDS/ÂΜL (ref 1.85–7.62)
NEUTS SEG NFR BLD AUTO: 73 % (ref 43–75)
NITRITE UR QL STRIP: NEGATIVE
NON-SQ EPI CELLS URNS QL MICRO: NORMAL /HPF
NRBC BLD AUTO-RTO: 0 /100 WBCS
O2 CT BLDV-SCNC: 13.6 ML/DL
P AXIS: 37 DEGREES
PCO2 BLDV: 51.1 MM HG (ref 42–50)
PH BLDV: 7.3 [PH] (ref 7.3–7.4)
PH UR STRIP.AUTO: 6 [PH]
PHOSPHATE SERPL-MCNC: 3.8 MG/DL (ref 2.7–4.5)
PLATELET # BLD AUTO: 446 THOUSANDS/UL (ref 149–390)
PMV BLD AUTO: 11 FL (ref 8.9–12.7)
PO2 BLDV: 50.7 MM HG (ref 35–45)
POTASSIUM SERPL-SCNC: 3.9 MMOL/L (ref 3.5–5.3)
PR INTERVAL: 194 MS
PROCALCITONIN SERPL-MCNC: 1.16 NG/ML
PROT SERPL-MCNC: 6.7 G/DL (ref 6.4–8.4)
PROT UR STRIP-MCNC: ABNORMAL MG/DL
PROT UR-MCNC: 80 MG/DL
PROT/CREAT UR: 0.96 MG/G{CREAT} (ref 0–0.1)
PROTHROMBIN TIME: 13.1 SECONDS (ref 11.6–14.5)
QRS AXIS: -11 DEGREES
QRSD INTERVAL: 70 MS
QT INTERVAL: 414 MS
QTC INTERVAL: 459 MS
RBC # BLD AUTO: 3.87 MILLION/UL (ref 3.81–5.12)
RBC #/AREA URNS AUTO: NORMAL /HPF
SODIUM SERPL-SCNC: 126 MMOL/L (ref 135–147)
SP GR UR STRIP.AUTO: 1.02
T WAVE AXIS: 37 DEGREES
UROBILINOGEN UR QL STRIP.AUTO: 1 E.U./DL
VENTRICULAR RATE: 74 BPM
WBC # BLD AUTO: 16.74 THOUSAND/UL (ref 4.31–10.16)
WBC #/AREA URNS AUTO: NORMAL /HPF

## 2023-02-17 PROCEDURE — 0J970ZZ DRAINAGE OF BACK SUBCUTANEOUS TISSUE AND FASCIA, OPEN APPROACH: ICD-10-PCS | Performed by: SURGERY

## 2023-02-17 RX ORDER — ONDANSETRON 2 MG/ML
4 INJECTION INTRAMUSCULAR; INTRAVENOUS EVERY 6 HOURS PRN
Status: DISCONTINUED | OUTPATIENT
Start: 2023-02-17 | End: 2023-02-18 | Stop reason: HOSPADM

## 2023-02-17 RX ORDER — DOCUSATE SODIUM 100 MG/1
100 CAPSULE, LIQUID FILLED ORAL 2 TIMES DAILY
Status: DISCONTINUED | OUTPATIENT
Start: 2023-02-17 | End: 2023-02-18 | Stop reason: HOSPADM

## 2023-02-17 RX ORDER — INSULIN LISPRO 100 [IU]/ML
1-6 INJECTION, SOLUTION INTRAVENOUS; SUBCUTANEOUS
Status: DISCONTINUED | OUTPATIENT
Start: 2023-02-17 | End: 2023-02-18 | Stop reason: HOSPADM

## 2023-02-17 RX ORDER — OXYCODONE HYDROCHLORIDE 10 MG/1
10 TABLET ORAL EVERY 6 HOURS PRN
Status: DISCONTINUED | OUTPATIENT
Start: 2023-02-17 | End: 2023-02-18 | Stop reason: HOSPADM

## 2023-02-17 RX ORDER — VANCOMYCIN HYDROCHLORIDE 1 G/200ML
1000 INJECTION, SOLUTION INTRAVENOUS ONCE
Status: COMPLETED | OUTPATIENT
Start: 2023-02-17 | End: 2023-02-17

## 2023-02-17 RX ORDER — NICOTINE 21 MG/24HR
1 PATCH, TRANSDERMAL 24 HOURS TRANSDERMAL DAILY
Status: DISCONTINUED | OUTPATIENT
Start: 2023-02-17 | End: 2023-02-18 | Stop reason: HOSPADM

## 2023-02-17 RX ORDER — ACETAMINOPHEN 325 MG/1
650 TABLET ORAL EVERY 4 HOURS PRN
Status: DISCONTINUED | OUTPATIENT
Start: 2023-02-17 | End: 2023-02-18 | Stop reason: HOSPADM

## 2023-02-17 RX ORDER — LIDOCAINE HYDROCHLORIDE AND EPINEPHRINE 10; 10 MG/ML; UG/ML
40 INJECTION, SOLUTION INFILTRATION; PERINEURAL ONCE
Status: COMPLETED | OUTPATIENT
Start: 2023-02-17 | End: 2023-02-17

## 2023-02-17 RX ORDER — MAGNESIUM SULFATE HEPTAHYDRATE 40 MG/ML
2 INJECTION, SOLUTION INTRAVENOUS ONCE
Status: COMPLETED | OUTPATIENT
Start: 2023-02-17 | End: 2023-02-17

## 2023-02-17 RX ORDER — GABAPENTIN 600 MG/1
300 TABLET ORAL 2 TIMES DAILY
Status: DISCONTINUED | OUTPATIENT
Start: 2023-02-17 | End: 2023-02-18 | Stop reason: HOSPADM

## 2023-02-17 RX ORDER — INSULIN LISPRO 100 [IU]/ML
10 INJECTION, SOLUTION INTRAVENOUS; SUBCUTANEOUS
Status: DISCONTINUED | OUTPATIENT
Start: 2023-02-17 | End: 2023-02-18 | Stop reason: HOSPADM

## 2023-02-17 RX ORDER — INSULIN GLARGINE 100 [IU]/ML
15 INJECTION, SOLUTION SUBCUTANEOUS ONCE
Status: COMPLETED | OUTPATIENT
Start: 2023-02-17 | End: 2023-02-17

## 2023-02-17 RX ORDER — INSULIN LISPRO 100 [IU]/ML
2-12 INJECTION, SOLUTION INTRAVENOUS; SUBCUTANEOUS
Status: DISCONTINUED | OUTPATIENT
Start: 2023-02-17 | End: 2023-02-18 | Stop reason: HOSPADM

## 2023-02-17 RX ORDER — OXYCODONE HYDROCHLORIDE 5 MG/1
5 TABLET ORAL EVERY 6 HOURS PRN
Status: DISCONTINUED | OUTPATIENT
Start: 2023-02-17 | End: 2023-02-18 | Stop reason: HOSPADM

## 2023-02-17 RX ORDER — SODIUM CHLORIDE, SODIUM GLUCONATE, SODIUM ACETATE, POTASSIUM CHLORIDE, MAGNESIUM CHLORIDE, SODIUM PHOSPHATE, DIBASIC, AND POTASSIUM PHOSPHATE .53; .5; .37; .037; .03; .012; .00082 G/100ML; G/100ML; G/100ML; G/100ML; G/100ML; G/100ML; G/100ML
125 INJECTION, SOLUTION INTRAVENOUS CONTINUOUS
Status: DISCONTINUED | OUTPATIENT
Start: 2023-02-17 | End: 2023-02-18 | Stop reason: HOSPADM

## 2023-02-17 RX ORDER — HEPARIN SODIUM 5000 [USP'U]/ML
7500 INJECTION, SOLUTION INTRAVENOUS; SUBCUTANEOUS EVERY 8 HOURS SCHEDULED
Status: DISCONTINUED | OUTPATIENT
Start: 2023-02-17 | End: 2023-02-18 | Stop reason: HOSPADM

## 2023-02-17 RX ORDER — INSULIN GLARGINE 100 [IU]/ML
30 INJECTION, SOLUTION SUBCUTANEOUS
Status: DISCONTINUED | OUTPATIENT
Start: 2023-02-17 | End: 2023-02-18 | Stop reason: HOSPADM

## 2023-02-17 RX ORDER — CEFTRIAXONE 2 G/50ML
2000 INJECTION, SOLUTION INTRAVENOUS ONCE
Status: COMPLETED | OUTPATIENT
Start: 2023-02-17 | End: 2023-02-17

## 2023-02-17 RX ADMIN — SODIUM CHLORIDE, SODIUM GLUCONATE, SODIUM ACETATE, POTASSIUM CHLORIDE, MAGNESIUM CHLORIDE, SODIUM PHOSPHATE, DIBASIC, AND POTASSIUM PHOSPHATE 125 ML/HR: .53; .5; .37; .037; .03; .012; .00082 INJECTION, SOLUTION INTRAVENOUS at 17:00

## 2023-02-17 RX ADMIN — HEPARIN SODIUM 7500 UNITS: 5000 INJECTION INTRAVENOUS; SUBCUTANEOUS at 07:56

## 2023-02-17 RX ADMIN — OXYCODONE HYDROCHLORIDE 10 MG: 10 TABLET ORAL at 21:00

## 2023-02-17 RX ADMIN — SODIUM CHLORIDE 1000 ML: 0.9 INJECTION, SOLUTION INTRAVENOUS at 02:51

## 2023-02-17 RX ADMIN — VANCOMYCIN HYDROCHLORIDE 1000 MG: 1 INJECTION, SOLUTION INTRAVENOUS at 01:49

## 2023-02-17 RX ADMIN — INSULIN GLARGINE 15 UNITS: 100 INJECTION, SOLUTION SUBCUTANEOUS at 04:19

## 2023-02-17 RX ADMIN — INSULIN GLARGINE 30 UNITS: 100 INJECTION, SOLUTION SUBCUTANEOUS at 22:26

## 2023-02-17 RX ADMIN — INSULIN LISPRO 10 UNITS: 100 INJECTION, SOLUTION INTRAVENOUS; SUBCUTANEOUS at 16:08

## 2023-02-17 RX ADMIN — SODIUM CHLORIDE, SODIUM GLUCONATE, SODIUM ACETATE, POTASSIUM CHLORIDE, MAGNESIUM CHLORIDE, SODIUM PHOSPHATE, DIBASIC, AND POTASSIUM PHOSPHATE 125 ML/HR: .53; .5; .37; .037; .03; .012; .00082 INJECTION, SOLUTION INTRAVENOUS at 07:17

## 2023-02-17 RX ADMIN — DOCUSATE SODIUM 100 MG: 100 CAPSULE, LIQUID FILLED ORAL at 08:00

## 2023-02-17 RX ADMIN — GABAPENTIN 300 MG: 600 TABLET, FILM COATED ORAL at 17:18

## 2023-02-17 RX ADMIN — INSULIN LISPRO 8 UNITS: 100 INJECTION, SOLUTION INTRAVENOUS; SUBCUTANEOUS at 16:07

## 2023-02-17 RX ADMIN — INSULIN HUMAN 15 UNITS: 100 INJECTION, SOLUTION PARENTERAL at 02:48

## 2023-02-17 RX ADMIN — LIDOCAINE HYDROCHLORIDE,EPINEPHRINE BITARTRATE 40 ML: 10; .01 INJECTION, SOLUTION INFILTRATION; PERINEURAL at 08:30

## 2023-02-17 RX ADMIN — INSULIN LISPRO 10 UNITS: 100 INJECTION, SOLUTION INTRAVENOUS; SUBCUTANEOUS at 07:54

## 2023-02-17 RX ADMIN — GABAPENTIN 300 MG: 600 TABLET, FILM COATED ORAL at 08:00

## 2023-02-17 RX ADMIN — OXYCODONE HYDROCHLORIDE 5 MG: 5 TABLET ORAL at 12:21

## 2023-02-17 RX ADMIN — CEFTRIAXONE 2000 MG: 2 INJECTION, SOLUTION INTRAVENOUS at 01:23

## 2023-02-17 RX ADMIN — INSULIN LISPRO 10 UNITS: 100 INJECTION, SOLUTION INTRAVENOUS; SUBCUTANEOUS at 12:22

## 2023-02-17 RX ADMIN — HEPARIN SODIUM 7500 UNITS: 5000 INJECTION INTRAVENOUS; SUBCUTANEOUS at 22:25

## 2023-02-17 RX ADMIN — MUPIROCIN 1 APPLICATION.: 20 OINTMENT TOPICAL at 16:01

## 2023-02-17 RX ADMIN — METOPROLOL TARTRATE 75 MG: 50 TABLET, FILM COATED ORAL at 22:24

## 2023-02-17 RX ADMIN — SODIUM CHLORIDE 1000 ML: 0.9 INJECTION, SOLUTION INTRAVENOUS at 01:11

## 2023-02-17 RX ADMIN — INSULIN LISPRO 10 UNITS: 100 INJECTION, SOLUTION INTRAVENOUS; SUBCUTANEOUS at 12:23

## 2023-02-17 RX ADMIN — VANCOMYCIN HYDROCHLORIDE 1000 MG: 1 INJECTION, SOLUTION INTRAVENOUS at 03:08

## 2023-02-17 RX ADMIN — MUPIROCIN 1 APPLICATION.: 20 OINTMENT TOPICAL at 22:25

## 2023-02-17 RX ADMIN — DOCUSATE SODIUM 100 MG: 100 CAPSULE, LIQUID FILLED ORAL at 17:18

## 2023-02-17 RX ADMIN — MAGNESIUM SULFATE HEPTAHYDRATE 2 G: 40 INJECTION, SOLUTION INTRAVENOUS at 04:20

## 2023-02-17 RX ADMIN — INSULIN LISPRO 2 UNITS: 100 INJECTION, SOLUTION INTRAVENOUS; SUBCUTANEOUS at 22:26

## 2023-02-17 RX ADMIN — OXYCODONE HYDROCHLORIDE 5 MG: 5 TABLET ORAL at 04:09

## 2023-02-17 NOTE — ASSESSMENT & PLAN NOTE
Seen by PCP 2 days ago with labs ordered  She was advised to come to the ED for evaluation  Creat early December was 1-1 3, had BMP on 12/12/22 was up to 3 3   Patient was on Bactrim DS 1 po BID for 10 days for MRSA scalp abscess  · Creat was 3 1 two days ago and now 3 41  · Check post resdiual  · Renal US  · Nephrology consult  · IVF  · Check Urine

## 2023-02-17 NOTE — CONSULTS
Consultation - Nephrology   Francine Jaylin Bryson [address] 43 y o  female MRN: [id number]  Unit/Bed#: ED 29 Encounter: [id number]      Assessment and Plan    1  Acute kidney injury, present on mission, superimposed on chronic kidney disease  Peak creatinine 3 4 mg/dL estimated GFR 16 mL/min on 2/17/2023  Appears that baseline creatinine is 1 1 mg/dL with estimated GFR 63 mL/min on 3/18/2022  Likely secondary to hemodynamic perturbations of sepsis, continuation of valsartan-hydrochlorothiazide, long-term NSAID use  Hold NSAIDs, valsartan, hydrochlorothiazide  Rule out urinary obstruction  Renal and bladder ultrasound pending  Request urine studies to determine if patient is in acute tubular necrosis  Trend renal function, optimize hemodynamics, hold ACE/ARB, avoid nephrotoxins, renally dose medications, monitor volume status, monitor for urinary retention  2  Chronic kidney disease, stage IIIa with baseline creatinine 1 1 mg/dL  Risk factors include morbid obesity, hypertension, diabetes, long-term NSAID use  There may be a family history of some renal failure as well, uncertain cause of aunt's kidney transplant  Check PC ratio  Obtain baseline renal ultrasound  Will need outpatient nephrology follow-up  3  Diabetes mellitus, type II, on long-term use of insulin, with likely diabetic nephropathy  4  Hypertension the setting of chronic kidney disease  Hold home valsartan-hydrochlorothiazide given hypotension and acute kidney injury  Continue to monitor blood pressures  Status post volume expansion  5  Sepsis, present admission  In the setting #6  Management per hospitalist colleagues  6  Abscess of back  Management per surgical colleagues  7  Obesity with BMI 53  BMI greater than 40 is associated with greater risk of progression of renal disease secondary to glomerular hyperfiltration  Recommend weight loss  8  Hypomagnesemia   Continue to monitor and replete for magnesium goal 2 0 mg/dL and potassium 4 0 millimole per L     9  Pseudohyponatremia  Normal when corrected for hyperglycemia  Continue to monitor  Can adriana blood glucose  Thank you for allowing us to participate in the care of your patient  Please feel free to contact us regarding the care of this patient, or any other questions/concerns that may be applicable  Patient Active Problem List   Diagnosis   • Arterial insufficiency of lower extremity (HCC)   • Class 3 severe obesity due to excess calories with serious comorbidity and body mass index (BMI) of 50 0 to 59 9 in [facility])   • Marijuana use   • Abscess of back   • Primary hypertension   • Hypothyroidism   • Type 2 diabetes mellitus with diabetic neuropathy, with long-term current use of insulin (HCC)   • Pure hypercholesterolemia   • Stage 3a chronic kidney disease ([facility] [address] )   • Acute kidney injury ([facility] [address] )   • Hypomagnesemia   • Hyponatremia   • Lactic acidosis       History of Present Illness     Physician Requesting Consult: Otoniel Peters, *    Reason for Consult / Principal Problem: Acute kidney injury    Hx and PE limited by: None    HPI: Alessandra Medellin is a 43y o  year old female with PMH of obesity BMI 53, HTN, DMT2, CKD, Hx abscesses, L drop foot  who presented to [address] [place] [address] emergency department on 2/17/2023 for evaluation of abnormal outpatient labs prior to appointment to establish care with a primary care physician  At the visit, she was diagnosed with a back abscess and started on clindamycin, but labs revealed serum creatinine 3 1 mg/dL and hyperglycemia  Nephrology is consulted for evaluation of acute kidney injury  Peak creatinine 3 4 mg/dL estimated GFR 16 mL/min on 2/17/2023  Appears that baseline creatinine is 1 1 mg/dL with estimated GFR 63 mL/min on 3/18/2022  Labs were also notable for hyponatremia with serum sodium 126 mmol/L -> corrected for hyperglycemia and to be normal at 136 mmol/L      Upon speaking with the patient, she reports 1 day of poor p o  intake prior to admission followed by the next day of increased appetite  She denies any vomiting or diarrhea  She is taking ibuprofen every 8 hours for the past 2 years  She has at least a 2-year history of diabetes mellitus, hypertension and obesity with BMI 53  Home medication includes valsartan-hydrochlorothiazide  She has an aunt who had a renal transplant  She is not certain of the cause of her renal disease  History obtained from chart and patient  Review of Systems    Pain at site of back abscess  Denies poor p o  intake, vomiting or diarrhea  A complete 10 point review of systems was performed and is otherwise negative         Historical Information   Past Medical History:   Diagnosis Date   • Abscess of left thigh    • Abscess, scalp    • Diabetes mellitus ([facility] [address] )    • Diabetes mellitus, type 2 (HCC)    • Hypertension    • Hypothyroidism    • Renal disorder    • Severe obesity (BMI >= 40) (HCC)      Past Surgical History:   Procedure Laterality Date   • ABCESS DRAINAGE  12/2022    scalp and thigh   • HIP SURGERY Left     fracture- june 2008   • SHOULDER SURGERY       Social History   Social History     Substance and Sexual Activity   Alcohol Use Never     Social History     Substance and Sexual Activity   Drug Use Yes   • Types: Marijuana     Social History     Tobacco Use   Smoking Status Every Day   • Packs/day: 1 00   • Years: 20 00   • Pack years: 20 00   • Types: Cigarettes   Smokeless Tobacco Never     Family History   Problem Relation Age of Onset   • Cancer Mother    • Diabetes Mother    • Cancer Father    • Other Father         aortic insufficiency       Meds/Allergies   all current active meds have been reviewed, current meds:   Current Facility-Administered Medications   Medication Dose Route Frequency   • acetaminophen (TYLENOL) tablet 650 mg  650 mg Oral Q4H PRN   • docusate sodium (COLACE) capsule 100 mg  100 mg Oral BID   • gabapentin (NEURONTIN) tablet 300 mg  300 mg Oral BID   • heparin (porcine) subcutaneous injection 7,500 Units  7,500 Units Subcutaneous Q8H [address]   • insulin glargine (LANTUS) subcutaneous injection 30 Units 0 3 mL  30 Units Subcutaneous HS   • insulin lispro (HumaLOG) 100 units/mL subcutaneous injection 1-6 Units  1-6 Units Subcutaneous HS   • insulin lispro (HumaLOG) 100 units/mL subcutaneous injection 10 Units  10 Units Subcutaneous TID With Meals   • insulin lispro (HumaLOG) 100 units/mL subcutaneous injection 2-12 Units  2-12 Units Subcutaneous TID AC   • lidocaine-epinephrine (XYLOCAINE/EPINEPHRINE) 1 %-1:100,000 injection 40 mL  40 mL Infiltration Once   • metoprolol tartrate (LOPRESSOR) tablet 75 mg  75 mg Oral Q12H JORGE   • multi-electrolyte (PLASMALYTE-A/ISOLYTE-S PH 7 4) IV solution  125 mL/hr Intravenous Continuous   • nicotine (NICODERM CQ) 21 mg/24 hr TD 24 hr patch 1 patch  1 patch Transdermal Daily   • ondansetron (ZOFRAN) injection 4 mg  4 mg Intravenous Q6H PRN   • oxyCODONE (ROXICODONE) IR tablet 5 mg  5 mg Oral Q6H PRN   • [START ON 2/18/2023] vancomycin (VANCOCIN) 1,750 mg in sodium chloride 0 9 % 500 mL IVPB  15 mg/kg (Adjusted) Intravenous Daily PRN    and PTA meds:  (Not in a hospital admission)        Allergies   Allergen Reactions   • Wasp Venom Anxiety, Headache, Itching, Rash and Swelling       Objective     Intake/Output Summary (Last 24 hours) at 2/17/2023 1048  Last data filed at 2/17/2023 0412  Gross per 24 hour   Intake 2450 ml   Output --   Net 2450 ml       Invasive Devices:        Physical Exam  Vitals and nursing note reviewed  Exam conducted with a chaperone present  Constitutional:       General: She is not in acute distress  Appearance: Normal appearance  She is morbidly obese  She is not ill-appearing or toxic-appearing  HENT:      Head: Normocephalic and atraumatic  Nose: Nose normal  No congestion or rhinorrhea  Mouth/Throat:      Mouth: Mucous membranes are moist       Pharynx: Oropharynx is clear  Eyes:      General: No scleral icterus  Right eye: No discharge  Left eye: No discharge  Extraocular Movements: Extraocular movements intact  Conjunctiva/sclera: Conjunctivae normal       Pupils: Pupils are equal, round, and reactive to light  Cardiovascular:      Rate and Rhythm: Normal rate and regular rhythm  Pulses: Normal pulses  Heart sounds: Normal heart sounds  No murmur heard  Pulmonary:      Effort: Pulmonary effort is normal  No respiratory distress  Breath sounds: Normal breath sounds  No wheezing  Abdominal:      General: Abdomen is flat  Bowel sounds are normal  There is no distension  Palpations: Abdomen is soft  There is no mass  Tenderness: There is no abdominal tenderness  Musculoskeletal:         General: No swelling or deformity  Normal range of motion  Cervical back: Normal range of motion and neck supple  No rigidity or tenderness  Feet:      Comments: LLE walking boot  Skin:     General: Skin is warm and dry  Coloration: Skin is not jaundiced or pale  Findings: Erythema (back) present  No bruising or lesion  Neurological:      General: No focal deficit present  Mental Status: She is alert and oriented to person, place, and time  Mental status is at baseline  Psychiatric:         Mood and Affect: Mood normal          Behavior: Behavior normal          Thought Content: Thought content normal          Judgment: Judgment normal            I/O last 3 completed shifts: In: 2450 [IV Piggyback:2450]  Out: -     Vitals:    02/17/23 0752   BP: (!) 104/49   Pulse: 76   Resp: 16   Temp:    SpO2: (!) 89%         Current Weight:    First Weight:      Lab Results:  I have personally reviewed pertinent labs      CBC:   Lab Results   Component Value Date    WBC 16 74 (H) 02/17/2023    HGB 10 8 (L) 02/17/2023    HCT 34 1 (L) 02/17/2023    MCV 88 02/17/2023     (H) 02/17/2023    MCH 27 9 02/17/2023    MCHC 31 7 02/17/2023 RDW 13 6 02/17/2023    MPV 11 0 02/17/2023    NRBC 0 02/17/2023     CMP:   Lab Results   Component Value Date    K 3 9 02/17/2023    CL 89 (L) 02/17/2023    CO2 25 02/17/2023    BUN 45 (H) 02/17/2023    CREATININE 3 41 (H) 02/17/2023    CALCIUM 7 8 (L) 02/17/2023    AST 12 (L) 02/17/2023    ALT 16 02/17/2023    ALKPHOS 158 (H) 02/17/2023    EGFR 16 02/17/2023     Phosphorus:   Lab Results   Component Value Date    PHOS 3 8 02/17/2023     Magnesium:   Lab Results   Component Value Date    MG 1 5 (L) 02/17/2023     Urinalysis:   Lab Results   Component Value Date    COLORU Yellow 02/17/2023    CLARITYU Clear 02/17/2023    SPECGRAV 1 020 02/17/2023    PHUR 6 0 02/17/2023    LEUKOCYTESUR Negative 02/17/2023    NITRITE Negative 02/17/2023    GLUCOSEU 3+ (A) 02/17/2023    KETONESU Negative 02/17/2023    BILIRUBINUR Negative 02/17/2023    BLOODU Negative 02/17/2023     Ionized Calcium: No results found for: CAION  Coagulation:   Lab Results   Component Value Date    INR 0 99 02/17/2023     Troponin: No results found for: TROPONINI  ABG: No results found for: PHART, ICF0OXV, PO2ART, WSC3GLQ, B1ZLCSUG, BEART, SOURCE    Results from last 7 days   Lab Units 02/17/23  0116 02/15/23  1427   POTASSIUM mmol/L 3 9 4 2   CHLORIDE mmol/L 89* 86*   CO2 mmol/L 25 27   BUN mg/dL 45* 32*   CREATININE mg/dL 3 41* 3 12*   CALCIUM mg/dL 7 8* 8 7   ALK PHOS U/L 158* 149*   ALT U/L 16 13   AST U/L 12* 9*       Radiology review:  No results found  Cheyanne Chavez PA-C      Portions of the record may have been created with voice recognition software  Occasional wrong word or "sound a like" substitutions may have occurred due to the inherent limitations of voice recognition software  Read the chart carefully and recognize, using context, where substitutions have occurred

## 2023-02-17 NOTE — PROGRESS NOTES
Monico Carcamo is a 43 y o  female who is currently ordered Vancomycin IV with management by the Pharmacy Consult service  Relevant clinical data and objective / subjective history reviewed  Vancomycin Assessment:  Indication and Goal AUC/Trough: Soft tissue (goal -600, trough >10)  Clinical Status:  New Start  Micro:   Pending  Renal Function:  SCr: 3 41 mg/dL  CrCl: 35 2 mL/min  Renal replacement: Not on dialysis (Potentially MACARIO)  Days of Therapy: 1  Current Dose: 3000mg IV once then 2500mg IV q24h  Vancomycin Plan:  New Dosinmg IV once then 1750mg IV daily PRN for random level is 15mcg/ml or less  Pulse dosing (1750mg IV daily PRN for random level 15mcg/ml or less)  Next Level: 23 AM labs   Renal Function Monitoring: Daily BMP and [place] will continue to follow closely for s/sx of nephrotoxicity, infusion reactions and appropriateness of therapy  BMP and CBC will be ordered per protocol  We will continue to follow the patient’s culture results and clinical progress daily      Anette De La Rosa, Pharmacist

## 2023-02-17 NOTE — SEPSIS NOTE
Sepsis Note   Gian Carrion 43 y o  female MRN: [id number]  Unit/Bed#: ED 29 Encounter: [id number]       Initial Sepsis Screening     Row Name 02/17/23 0207                Is the patient's history suggestive of a new or worsening infection? Yes (Proceed)  -RC        Suspected source of infection soft tissue  -RC        Indicate SIRS criteria Leukocytosis (WBC > 36739 IJL) OR Leukopenia (WBC <4000 IJL) OR Bandemia (WBC >10% bands)  -RC        Are two or more of the above signs & symptoms of infection both present and new to the patient? Yes (Proceed)  -RC        Assess for evidence of organ dysfunction: Are any of the below criteria present within 6 hours of suspected infection and SIRS criteria that are NOT considered to be chronic conditions? Lactate > 2  0;Creatinine > 2 0 AND > 0 5 above baseline  -RC        Date of presentation of severe sepsis 02/17/23  -RC        Time of presentation of severe sepsis 0208  -RC        Sepsis Note: Click "NEXT" below (NOT "close") to generate sepsis note based on above information  YES (proceed by clicking "NEXT")  -              User Key  (r) = Recorded By, (t) = Taken By, (c) = Cosigned By    [address] Name Provider Type    Kelsey Will MD Physician                    There is no height or weight on file to calculate BMI    Wt Readings from Last 1 Encounters:   02/14/23 (!) 170 kg (374 lb)        Ideal body weight: 68 5 kg (151 lb 0 2 oz)  Adjusted ideal body weight: 109 kg (240 lb 3 3 oz)

## 2023-02-17 NOTE — PLAN OF CARE
Problem: PAIN - ADULT  Goal: Verbalizes/displays adequate comfort level or baseline comfort level  Description: Interventions:  - Encourage patient to monitor pain and request assistance  - Assess pain using appropriate pain scale  - Administer analgesics based on type and severity of pain and evaluate response  - Implement non-pharmacological measures as appropriate and evaluate response  - Consider cultural and social influences on pain and pain management  - Notify physician/advanced practitioner if interventions unsuccessful or patient reports new pain  2/17/2023 1641 by Ignacia Bedolla RN  Outcome: Progressing  2/17/2023 1637 by Ignacia Bedolla RN  Outcome: Progressing     Problem: INFECTION - ADULT  Goal: Absence or prevention of progression during hospitalization  Description: INTERVENTIONS:  - Assess and monitor for signs and symptoms of infection  - Monitor lab/diagnostic results  - Monitor all insertion sites, i e  indwelling lines, tubes, and drains  - Monitor endotracheal if appropriate and nasal secretions for changes in amount and color  - [place] appropriate cooling/warming therapies per order  - Administer medications as ordered  - Instruct and encourage patient and family to use good hand hygiene technique  - Identify and instruct in appropriate isolation precautions for identified infection/condition  2/17/2023 1641 by Ignacia Bedolla RN  Outcome: Progressing  2/17/2023 1637 by Ignacia Bedolla RN  Outcome: Progressing  Goal: Absence of fever/infection during neutropenic period  Description: INTERVENTIONS:  - Monitor WBC    2/17/2023 1641 by Ignacia Bedolla RN  Outcome: Progressing  2/17/2023 1637 by Ignacia Bedolla RN  Outcome: Progressing     Problem: SAFETY ADULT  Goal: Patient will remain free of falls  Description: INTERVENTIONS:  - Educate patient/family on patient safety including physical limitations  - Instruct patient to call for assistance with activity   - Consult OT/PT to assist with strengthening/mobility - Keep Call bell within reach  - Keep bed low and locked with side rails adjusted as appropriate  - Keep care items and personal belongings within reach  - Initiate and maintain comfort rounds  - Make Fall Risk Sign visible to staff  - Offer Toileting every 2 Hours, in advance of need  - Initiate/Maintain bed alarm  - Obtain necessary fall risk management equipment  - Apply yellow socks and bracelet for high fall risk patients  - Consider moving patient to room near nurses station  2/17/2023 1641 by Cami Lindsey RN  Outcome: Progressing  2/17/2023 1637 by Cami Lindsey RN  Outcome: Progressing  Goal: Maintain or return to baseline ADL function  Description: INTERVENTIONS:  -  Assess patient's ability to carry out ADLs; assess patient's baseline for ADL function and identify physical deficits which impact ability to perform ADLs (bathing, care of mouth/teeth, toileting, grooming, dressing, etc )  - Assess/evaluate cause of self-care deficits   - Assess range of motion  - Assess patient's mobility; develop plan if impaired  - Assess patient's need for assistive devices and provide as appropriate  - Encourage maximum independence but intervene and supervise when necessary  - Involve family in performance of ADLs  - Assess for home care needs following discharge   - Consider OT consult to assist with ADL evaluation and planning for discharge  - Provide patient education as appropriate  2/17/2023 1641 by Cami Lindsey RN  Outcome: Progressing  2/17/2023 1637 by Cami Lindsey RN  Outcome: Progressing  Goal: Maintains/Returns to pre admission functional level  Description: INTERVENTIONS:  - Perform BMAT or MOVE assessment daily    - Set and communicate daily mobility goal to care team and patient/family/caregiver  - Collaborate with rehabilitation services on mobility goals if consulted  - Perform Range of Motion 2 times a day  - Reposition patient every 2 hours    - Dangle patient 2 times a day  - Stand patient 2 times a day  - Ambulate patient 2 times a day  - Out of bed to chair 3 times a day   - Out of bed for meals 3 times a day  - Out of bed for toileting  - Record patient progress and toleration of activity level   2/17/2023 1641 by Ignacia Bedolla RN  Outcome: Progressing  2/17/2023 1637 by Ignacia Bedolla RN  Outcome: Progressing     Problem: DISCHARGE PLANNING  Goal: Discharge to home or other facility with appropriate resources  Description: INTERVENTIONS:  - Identify barriers to discharge w/patient and caregiver  - Arrange for needed discharge resources and transportation as appropriate  - Identify discharge learning needs (meds, wound care, etc )  - Arrange for interpretive services to assist at discharge as needed  - Refer to Case Management Department for coordinating discharge planning if the patient needs post-hospital services based on physician/advanced practitioner order or complex needs related to functional status, cognitive ability, or social support system  2/17/2023 1641 by Ignacia Bedolla RN  Outcome: Progressing  2/17/2023 1637 by Ignacia Bedolla RN  Outcome: Progressing     Problem: Knowledge Deficit  Goal: Patient/family/caregiver demonstrates understanding of disease process, treatment plan, medications, and discharge instructions  Description: Complete learning assessment and assess knowledge base    Interventions:  - Provide teaching at level of understanding  - Provide teaching via preferred learning methods  2/17/2023 1641 by Ignacia Bedolla RN  Outcome: Progressing  2/17/2023 1637 by Ignacia Bedolla RN  Outcome: Progressing     Problem: GENITOURINARY - ADULT  Goal: Maintains or returns to baseline urinary function  Description: INTERVENTIONS:  - Assess urinary function  - Encourage oral fluids to ensure adequate hydration if ordered  - Administer IV fluids as ordered to ensure adequate hydration  - Administer ordered medications as needed  - Offer frequent toileting  - Follow urinary retention protocol if ordered  2/17/2023 1641 by Mackenzie Cano RN  Outcome: Progressing  2/17/2023 1637 by Mackenzie Cano RN  Outcome: Progressing  Goal: Urinary catheter remains patent  Description: INTERVENTIONS:  - Assess patency of urinary catheter  - If patient has a chronic rollins, consider changing catheter if non-functioning  - Follow guidelines for intermittent irrigation of non-functioning urinary catheter  2/17/2023 1641 by Mackenzie Cano RN  Outcome: Progressing  2/17/2023 1637 by Mackenzie Cano RN  Outcome: Progressing

## 2023-02-17 NOTE — ASSESSMENT & PLAN NOTE
· Patient seen on 2/14 to establish care and was started on clindamycin 300 mg 4 times daily  · Surgery consult  · IV abx  · Hx MRSA on wound culture 12/22 at [facility] for scalp abcess    Had I&D and was d/c home on Bactrim DS for 10 days 12/22

## 2023-02-17 NOTE — ASSESSMENT & PLAN NOTE
Lab Results   Component Value Date    HGBA1C 13 2 (H) 12/07/2022   · Poorly controlled diabetes with elevated BS on admission  · Continue insulin with Lantus and meal coverage  · Hold metformin

## 2023-02-17 NOTE — H&P
[address] 1985, 43 y o  female MRN: [id number]  Unit/Bed#: ED 28 Encounter: [id number]  Primary Care Provider: Valerio Mccoy MD   Date and time admitted to hospital: 2/17/2023 12:40 AM      * Acute kidney injury [facility])  Assessment & Plan  Seen by PCP 2 days ago with labs ordered  She was advised to come to the ED for evaluation  Creat early December was 1-1 3, had BMP on 12/12/22 was up to 3 3  Patient was on Bactrim DS 1 po BID for 10 days for MRSA scalp abscess  · Creat was 3 1 two days ago and now 3 41  · Check post resdiual  · Renal US  · Nephrology consult  · IVF  · Check Urine    Abscess of back  Assessment & Plan  · Patient seen on 2/14 to establish care and was started on clindamycin 300 mg 4 times daily  · Surgery consult  · IV abx  · Hx MRSA on wound culture 12/22 at [facility] for scalp abcess  Had I&D and was d/c home on Bactrim DS for 10 days 12/22    Lactic acidosis  Assessment & Plan  · Likely w/ dehydration and Metformin  · Does not meet sepsis at this time w/ vitals   Currently only parameter is leukocytosis    Hyponatremia  Assessment & Plan  · Secondary to hyperglycemia - corrects to 136  · monitor    Hypomagnesemia  Assessment & Plan  · Replete and monitor    Type 2 diabetes mellitus with diabetic neuropathy, with long-term current use of insulin (HCC)  Assessment & Plan    Lab Results   Component Value Date    HGBA1C 13 2 (H) 12/07/2022   · Poorly controlled diabetes with elevated BS on admission  · Continue insulin with Lantus and meal coverage  · Hold metformin    Hypothyroidism  Assessment & Plan  · Continue levothyroxine    Primary hypertension  Assessment & Plan  · Continue amlodipine and metoprolol with hold parameters  · Hold Diovan HCTZ    Class 3 severe obesity due to excess calories with serious comorbidity and body mass index (BMI) of 50 0 to 59 9 in adult [facility])  Assessment & Plan  · BMI greater than 50  · Healthy diet recommended with lifestyle modification  · Consider referral to bariatric services with PCP    VTE Pharmacologic Prophylaxis: VTE Score: 4 Moderate Risk (Score 3-4) - Pharmacological DVT Prophylaxis Ordered: heparin  Code Status: Level 1 - Full Code   Discussion with patient    Anticipated Length of Stay: Patient will be admitted on an inpatient basis with an anticipated length of stay of greater than 2 midnights secondary to IVF, IV abx, specialist input  Total Time Spent on Date of Encounter in care of patient: 75 minutes This time was spent on one or more of the following: performing physical exam; counseling and coordination of care; obtaining or reviewing history and recent admission records from [address] [place] [address] [id number]; documenting in the medical record; reviewing/ordering tests, medications or procedures; communicating with other healthcare professionals and discussing with patient's family/caregivers  Chief Complaint: abnormal labs    History of Present Illness:  Itzel Nuno is a 43 y o  female with a PMH of morbid obesity BMI >50, uncontrolled DM2 on insulin and gabapentin for neuropathy w/ CKD 3a, HTN, Hypothyroidism, hx MRSA abscess of scalp who presents with abnormal labs  Patient was admitted 12/2022 at [facility], had pain and swelling from abscess behind her ear, had I&D w/ MRSA  She was d/c home on Bactrim DS 1 tab po BID for 10 days  Had wound care nurse coming and had packing daily for 1 month  Now wound is closed  She had new patient visit that was closer to home on Tuesday and had labs done  She had the blood work done and was told to come on Wednesday, but needed to get things in order and a ride and came on Thursday  She notes decreased urine output last few days, before that was more frequent  No dysuria  At PCP office she was told to hold on the Amlodipine secondary to low BP in the office   She notes that she had weakness and SOB but that is better with the BP med held, she did have swelling in legs with weeping, but that is now resolved  She has droop foot on left and wears a walking boot  She has pain in her right knee from when she got off the exam table, feels like it went hyper extended  Uses a walker since Tuesday when she is at home to get around the house  Review of Systems:  Review of Systems   Constitutional: Negative for chills and fever  HENT: Negative for rhinorrhea, sore throat and trouble swallowing  Eyes: Negative for discharge and redness  Respiratory: Positive for shortness of breath  Negative for cough  Cardiovascular: Negative for chest pain and leg swelling  Gastrointestinal: Negative for abdominal pain, diarrhea, nausea and vomiting  Genitourinary: Negative for dysuria, frequency and hematuria  Musculoskeletal: Positive for arthralgias and back pain  Negative for neck pain  Skin: Negative for rash and wound  Neurological: Negative for dizziness, weakness and headaches  Psychiatric/Behavioral: Negative for agitation and confusion  Past Medical and Surgical History:   Past Medical History:   Diagnosis Date   • Abscess of left thigh    • Abscess, scalp    • Diabetes mellitus ([facility] )    • Diabetes mellitus, type 2 ([facility] )    • Hypertension    • Hypothyroidism    • Renal disorder    • Severe obesity (BMI >= 40) ([facility] )        Past Surgical History:   Procedure Laterality Date   • ABCESS DRAINAGE  12/2022    scalp and thigh   • HIP SURGERY Left     fracture- june 2008   • SHOULDER SURGERY         Meds/Allergies:  Prior to Admission medications    Medication Sig Start Date End Date Taking?  Authorizing Provider   amLODIPine (NORVASC) 2 5 mg tablet Take 3 tablets (7 5 mg total) by mouth daily 2/14/23 3/16/23  Valerio Mccoy MD   clindamycin (CLEOCIN) 300 MG capsule Take 1 capsule (300 mg total) by mouth 4 (four) times a day for 7 days 2/14/23 2/21/23  Valerio Mccoy MD   gabapentin (NEURONTIN) 600 MG tablet 600 mg 2 (two) times a day 1/14/19   Historical Provider, MD   glucose blood test strip e11 65 (Type 2 Diabetes) Test daily before meals 3 times a day 12/8/22   Historical Provider, MD   [facility] Alcohol Swabs 70 % PADS  1/10/23   Historical Provider, MD   insulin aspart (NovoLOG FlexPen ReliOn) 100 UNIT/ML injection pen TAKE EXTRA INSULIN BASED ON BG RESULTS PER SCALE PROVIDED (ISF 10-80 MG/DL OPTIONS)  E11 65 (TYPE 2 DIABETES) -200 = 1 UNIT HUMALOG -250 =<MORE> 1/27/23   Historical Provider, MD   Insulin Glargine Solostar (Lantus SoloStar) 100 UNIT/ML SOPN Inject 0 3 mL (30 Units total) under the skin daily 2/14/23 3/16/23  Samreen Ordoñez MD   metFORMIN (GLUCOPHAGE-XR) 500 mg 24 hr tablet Take 1,000 mg by mouth 12/29/22 6/27/23  Historical Provider, MD   Metoprolol Tartrate 75 MG TABS 75 mg every 12 (twelve) hours 2/7/19   Historical Provider, MD   valsartan-hydrochlorothiazide (DIOVAN-HCT) 320-25 MG per tablet  12/29/22   Historical Provider, MD     I have reviewed home medications with patient personally  Allergies:    Allergies   Allergen Reactions   • Wasp Venom Anxiety, Headache, Itching, Rash and Swelling       Social History:  Marital Status: Single   Occupation: disability  Patient Pre-hospital Living Situation: Home with family  Patient Pre-hospital Level of Mobility: walks normally but now requiring walker  Patient Pre-hospital Diet Restrictions: none  Substance Use History:   Social History     Substance and Sexual Activity   Alcohol Use Never     Social History     Tobacco Use   Smoking Status Every Day   • Packs/day: 1 00   • Years: 20 00   • Pack years: 20 00   • Types: Cigarettes   Smokeless Tobacco Never     Social History     Substance and Sexual Activity   Drug Use Yes   • Types: Marijuana       Family History:  Family History   Problem Relation Age of Onset   • Cancer Mother    • Diabetes Mother    • Cancer Father    • Other Father         aortic insufficiency       Physical Exam:     Vitals:   Blood Pressure: 131/61 (02/17/23 0043)  Pulse: 83 (02/17/23 0043)  Temperature: 98 3 °F (36 8 °C) (02/17/23 0136)  Temp Source: Temporal (02/17/23 0136)  Respirations: 18 (02/17/23 0043)  SpO2: 93 % (02/17/23 0043)    Physical Exam  Vitals reviewed  Constitutional:       Appearance: Normal appearance  She is morbidly obese  HENT:      Head: Normocephalic and atraumatic  Nose: Nose normal       Mouth/Throat:      Comments: Poor dentition  Eyes:      General:         Right eye: No discharge  Left eye: No discharge  Extraocular Movements: Extraocular movements intact  Conjunctiva/sclera: Conjunctivae normal    Cardiovascular:      Rate and Rhythm: Normal rate and regular rhythm  Pulmonary:      Effort: Pulmonary effort is normal  No respiratory distress  Breath sounds: Normal breath sounds  No wheezing  Abdominal:      General: Bowel sounds are normal  There is no distension  Palpations: Abdomen is soft  Tenderness: There is no abdominal tenderness  There is no guarding  Musculoskeletal:         General: Tenderness (right knee, no significant laxity) present  No swelling  Normal range of motion  Cervical back: Normal range of motion  Right lower leg: Edema present  Left lower leg: Edema present  Comments: Left leg in walking boot   Skin:     General: Skin is warm and dry  Capillary Refill: Capillary refill takes less than 2 seconds  Comments: Left mid back with large indurated area w/ multiple open areas, +drainage on dressing  Neurological:      General: No focal deficit present  Mental Status: She is alert and oriented to person, place, and time  Mental status is at baseline  Psychiatric:         Mood and Affect: Mood normal          Behavior: Behavior normal          Thought Content:  Thought content normal          Judgment: Judgment normal         Additional Data:     Lab Results:  Results from last 7 days   Lab Units 02/17/23  0116   WBC Thousand/uL 16 74*   HEMOGLOBIN g/dL 10 8*   HEMATOCRIT % 34 1* PLATELETS Thousands/uL 446*   NEUTROS PCT % 73   LYMPHS PCT % 15   MONOS PCT % 7   EOS PCT % 2     Results from last 7 days   Lab Units 02/17/23  0116   SODIUM mmol/L 126*   POTASSIUM mmol/L 3 9   CHLORIDE mmol/L 89*   CO2 mmol/L 25   BUN mg/dL 45*   CREATININE mg/dL 3 41*   ANION GAP mmol/L 12   CALCIUM mg/dL 7 8*   ALBUMIN g/dL 2 7*   TOTAL BILIRUBIN mg/dL 0 26   ALK PHOS U/L 158*   ALT U/L 16   AST U/L 12*   GLUCOSE RANDOM mg/dL 519*     Results from last 7 days   Lab Units 02/17/23  0116   INR  0 99     Results from last 7 days   Lab Units 02/17/23  0052   POC GLUCOSE mg/dl >500*         Results from last 7 days   Lab Units 02/17/23  0116   LACTIC ACID mmol/L 2 9*   PROCALCITONIN ng/ml 1 16*       Lines/Drains:  Invasive Devices     Peripheral Intravenous Line  Duration           Peripheral IV 02/17/23 Left;Proximal;Ventral (anterior) Forearm <1 day              Imaging: US pending  US kidney and bladder with pvr    (Results Pending)     ** Please Note: This note has been constructed using a voice recognition system   **

## 2023-02-17 NOTE — ASSESSMENT & PLAN NOTE
· Likely w/ dehydration and Metformin  · Does not meet sepsis at this time w/ vitals   Currently only parameter is leukocytosis

## 2023-02-17 NOTE — ASSESSMENT & PLAN NOTE
· BMI greater than 50  · Healthy diet recommended with lifestyle modification  · Consider referral to bariatric services with PCP

## 2023-02-17 NOTE — ED PROVIDER NOTES
History  Chief Complaint   Patient presents with   • Abnormal Lab     Pt states doctor did blood work and concerned for kidney function  54-year-old female with uncontrolled type 2 diabetes, hypertension who presents to the emergency department for evaluation of abnormal outpatient labs  Patient recently had an appointment with her primary care physician to establish care  While at that visit, patient was diagnosed with a back abscess and started on clindamycin, she was restarted on her Lantus and was advised to have outpatient labs performed  These labs were remarkable for elevated serum creatinine of 3 12 and hyperglycemia and patient was advised to go to the emergency department  Patient states that she has been doing well  Denies any headache, fever, chills, chest pain, shortness of breath, abdominal pain, nausea or vomiting  Continues to make urine  She states where the area of the abscess is on her back, is mildly uncomfortable and continues to drain purulent fluid  She has been taking clindamycin as prescribed  Prior to Admission Medications   Prescriptions Last Dose Informant Patient Reported? Taking?    GNP Alcohol Swabs 70 % PADS   Yes No   Insulin Glargine Solostar (Lantus SoloStar) 100 UNIT/ML SOPN   No No   Sig: Inject 0 3 mL (30 Units total) under the skin daily   Metoprolol Tartrate 75 MG TABS   Yes No   Si mg every 12 (twelve) hours   amLODIPine (NORVASC) 2 5 mg tablet   No No   Sig: Take 3 tablets (7 5 mg total) by mouth daily   clindamycin (CLEOCIN) 300 MG capsule   No No   Sig: Take 1 capsule (300 mg total) by mouth 4 (four) times a day for 7 days   gabapentin (NEURONTIN) 600 MG tablet   Yes No   Si mg 2 (two) times a day   glucose blood test strip   Yes No   Sig: e11 65 (Type 2 Diabetes) Test daily before meals 3 times a day   insulin aspart (NovoLOG FlexPen ReliOn) 100 UNIT/ML injection pen   Yes No   Sig: TAKE EXTRA INSULIN BASED ON BG RESULTS PER SCALE PROVIDED (ISF 10-80 MG/DL OPTIONS)  E11 65 (TYPE 2 DIABETES) -200 = 1 UNIT HUMALOG -250 =<MORE>   metFORMIN (GLUCOPHAGE-XR) 500 mg 24 hr tablet   Yes No   Sig: Take 1,000 mg by mouth   valsartan-hydrochlorothiazide (DIOVAN-HCT) 320-25 MG per tablet   Yes No      Facility-Administered Medications: None       Past Medical History:   Diagnosis Date   • Abscess of left thigh    • Abscess, scalp    • Diabetes mellitus (HCC)    • Diabetes mellitus, type 2 (HCC)    • Hypertension    • Hypothyroidism    • Renal disorder    • Severe obesity (BMI >= 40) (HCC)        Past Surgical History:   Procedure Laterality Date   • ABCESS DRAINAGE  12/2022    scalp and thigh   • HIP SURGERY Left     fracture- june 2008   • SHOULDER SURGERY         Family History   Problem Relation Age of Onset   • Cancer Mother    • Diabetes Mother    • Cancer Father    • Other Father         aortic insufficiency     I have reviewed and agree with the history as documented  E-Cigarette/Vaping     E-Cigarette/Vaping Substances     Social History     Tobacco Use   • Smoking status: Every Day     Packs/day: 1 00     Years: 20 00     Pack years: 20 00     Types: Cigarettes   • Smokeless tobacco: Never   Substance Use Topics   • Alcohol use: Never   • Drug use: Yes     Types: Marijuana       Review of Systems   Constitutional: Negative for chills and fever  HENT: Negative for ear pain and sore throat  Eyes: Negative for pain and visual disturbance  Respiratory: Negative for cough and shortness of breath  Cardiovascular: Negative for chest pain and palpitations  Gastrointestinal: Negative for abdominal pain and vomiting  Genitourinary: Negative for dysuria and hematuria  Musculoskeletal: Negative for arthralgias and back pain  Skin: Positive for color change and wound  Negative for rash  Neurological: Negative for seizures and syncope  All other systems reviewed and are negative        Physical Exam  Physical Exam  Vitals and nursing note reviewed  Constitutional:       Appearance: She is obese  HENT:      Head: Normocephalic and atraumatic  Right Ear: External ear normal       Left Ear: External ear normal       Nose: Nose normal       Mouth/Throat:      Mouth: Mucous membranes are moist    Eyes:      Extraocular Movements: Extraocular movements intact  Conjunctiva/sclera: Conjunctivae normal       Pupils: Pupils are equal, round, and reactive to light  Cardiovascular:      Rate and Rhythm: Normal rate and regular rhythm  Pulses: Normal pulses  Heart sounds: Normal heart sounds  No murmur heard  Pulmonary:      Effort: Pulmonary effort is normal  No respiratory distress  Breath sounds: Normal breath sounds  No stridor  Musculoskeletal:         General: No deformity  Normal range of motion  Cervical back: Normal range of motion and neck supple  Skin:     General: Skin is warm and dry  Capillary Refill: Capillary refill takes less than 2 seconds  Findings: Erythema present  Comments: Erythema with induration mid to lower back, multiple areas draining purulent discharge, no fluctuance, area is blanchable   Neurological:      General: No focal deficit present  Mental Status: She is alert and oriented to person, place, and time     Psychiatric:         Mood and Affect: Mood normal          Behavior: Behavior normal          Vital Signs  ED Triage Vitals   Temperature Pulse Respirations Blood Pressure SpO2   02/17/23 0136 02/17/23 0043 02/17/23 0043 02/17/23 0043 02/17/23 0043   98 3 °F (36 8 °C) 83 18 131/61 93 %      Temp Source Heart Rate Source Patient Position - Orthostatic VS BP Location FiO2 (%)   02/17/23 0136 02/17/23 0043 02/17/23 0043 02/17/23 0043 --   Temporal Monitor Lying Left arm       Pain Score       02/17/23 0043       5           Vitals:    02/17/23 0043   BP: 131/61   Pulse: 83   Patient Position - Orthostatic VS: Lying         Visual Acuity      ED Medications  Medications   magnesium sulfate 2 g/50 mL IVPB (premix) 2 g (2 g Intravenous New Bag 2/17/23 0420)   insulin lispro (HumaLOG) 100 units/mL subcutaneous injection 2-12 Units (has no administration in time range)   insulin lispro (HumaLOG) 100 units/mL subcutaneous injection 1-6 Units (has no administration in time range)   acetaminophen (TYLENOL) tablet 650 mg (has no administration in time range)   ondansetron (ZOFRAN) injection 4 mg (has no administration in time range)   nicotine (NICODERM CQ) 21 mg/24 hr TD 24 hr patch 1 patch (has no administration in time range)   heparin (porcine) subcutaneous injection 7,500 Units (has no administration in time range)   vancomycin (VANCOCIN) 1,750 mg in sodium chloride 0 9 % 500 mL IVPB (has no administration in time range)   oxyCODONE (ROXICODONE) IR tablet 5 mg (5 mg Oral Given 2/17/23 0409)   docusate sodium (COLACE) capsule 100 mg (has no administration in time range)   metoprolol tartrate (LOPRESSOR) tablet 75 mg (has no administration in time range)   insulin glargine (LANTUS) subcutaneous injection 30 Units 0 3 mL (has no administration in time range)   insulin lispro (HumaLOG) 100 units/mL subcutaneous injection 10 Units (has no administration in time range)   gabapentin (NEURONTIN) tablet 300 mg (has no administration in time range)   multi-electrolyte (PLASMALYTE-A/ISOLYTE-S PH 7 4) IV solution (has no administration in time range)   sodium chloride 0 9 % bolus 1,000 mL (0 mL Intravenous Stopped 2/17/23 0250)   cefTRIAXone (ROCEPHIN) IVPB (premix in dextrose) 2,000 mg 50 mL (0 mg Intravenous Stopped 2/17/23 0147)   vancomycin (VANCOCIN) IVPB (premix in dextrose) 1,000 mg 200 mL (0 mg Intravenous Stopped 2/17/23 0308)     Followed by   vancomycin (VANCOCIN) IVPB (premix in dextrose) 1,000 mg 200 mL (0 mg Intravenous Stopped 2/17/23 0410)   sodium chloride 0 9 % bolus 1,000 mL (0 mL Intravenous Stopped 2/17/23 0412)   insulin regular (HumuLIN R,NovoLIN R) injection 15 Units (15 Units Intravenous Given 2/17/23 0248)   insulin glargine (LANTUS) subcutaneous injection 15 Units 0 15 mL (15 Units Subcutaneous Given 2/17/23 0419)       Diagnostic Studies  Results Reviewed     Procedure Component Value Units Date/Time    Lactic acid 2 Hours [[id number]]  (Normal) Collected: 02/17/23 0414    Lab Status: Final result Specimen: Blood from Arm, Left Updated: 02/17/23 0448     LACTIC ACID 2 0 mmol/L     Narrative:      Result may be elevated if tourniquet was used during collection  Wound culture and Gram stain [[id number]] Collected: 02/17/23 0429    Lab Status: In process Specimen: Wound from Back Updated: 02/17/23 0433    UA w Reflex to Microscopic w Reflex to Culture [[id number]]     Lab Status: No result Specimen: Urine     Protime-INR [[id number]]  (Normal) Collected: 02/17/23 0116    Lab Status: Final result Specimen: Blood from Arm, Left Updated: 02/17/23 0206     Protime 13 1 seconds      INR 0 99    APTT [[id number]]  (Normal) Collected: 02/17/23 0116    Lab Status: Final result Specimen: Blood from Arm, Left Updated: 02/17/23 0206     PTT 31 seconds     Lactic acid [[id number]]  (Abnormal) Collected: 02/17/23 0116    Lab Status: Final result Specimen: Blood from Arm, Left Updated: 02/17/23 0202     LACTIC ACID 2 9 mmol/L     Narrative:      Result may be elevated if tourniquet was used during collection      Comprehensive metabolic panel [[id number]]  (Abnormal) Collected: 02/17/23 0116    Lab Status: Final result Specimen: Blood from Arm, Left Updated: 02/17/23 0202     Sodium 126 mmol/L      Potassium 3 9 mmol/L      Chloride 89 mmol/L      CO2 25 mmol/L      ANION GAP 12 mmol/L      BUN 45 mg/dL      Creatinine 3 41 mg/dL      Glucose 519 mg/dL      Calcium 7 8 mg/dL      Corrected Calcium 8 8 mg/dL      AST 12 U/L      ALT 16 U/L      Alkaline Phosphatase 158 U/L      Total Protein 6 7 g/dL      Albumin 2 7 g/dL      Total Bilirubin 0 26 mg/dL      eGFR 16 ml/min/1 73sq m     Narrative:      [facility] guidelines for Chronic Kidney Disease (CKD):   •  Stage 1 with normal or high GFR (GFR > 90 mL/min/1 73 square meters)  •  Stage 2 Mild CKD (GFR = 60-89 mL/min/1 73 square meters)  •  Stage 3A Moderate CKD (GFR = 45-59 mL/min/1 73 square meters)  •  Stage 3B Moderate CKD (GFR = 30-44 mL/min/1 73 square meters)  •  Stage 4 Severe CKD (GFR = 15-29 mL/min/1 73 square meters)  •  Stage 5 End Stage CKD (GFR <15 mL/min/1 73 square meters)  Note: GFR calculation is accurate only with a steady state creatinine    Procalcitonin [[id number]]  (Abnormal) Collected: 02/17/23 0116    Lab Status: Final result Specimen: Blood from Arm, Left Updated: 02/17/23 0202     Procalcitonin 1 16 ng/ml     Magnesium [[id number]]  (Abnormal) Collected: 02/17/23 0116    Lab Status: Final result Specimen: Blood from Arm, Left Updated: 02/17/23 0156     Magnesium 1 5 mg/dL     Phosphorus [[id number]]  (Normal) Collected: 02/17/23 0116    Lab Status: Final result Specimen: Blood from Arm, Left Updated: 02/17/23 0156     Phosphorus 3 8 mg/dL     Beta Hydroxybutyrate [[id number]]  (Normal) Collected: 02/17/23 0116    Lab Status: Final result Specimen: Blood from Arm, Left Updated: 02/17/23 0133     BETA-HYDROXYBUTYRATE 0 1 mmol/L     Blood gas, venous [[id number]]  (Abnormal) Collected: 02/17/23 0116    Lab Status: Final result Specimen: Blood from Arm, Left Updated: 02/17/23 0133     pH, Ap 7 303     pCO2, Ap 51 1 mm Hg      pO2, Ap 50 7 mm Hg      HCO3, Ap 24 7 mmol/L      Base Excess, Ap -2 1 mmol/L      O2 Content, Ap 13 6 ml/dL      O2 HGB, VENOUS 77 9 %     CBC and differential [[id number]]  (Abnormal) Collected: 02/17/23 0116    Lab Status: Final result Specimen: Blood from Arm, Left Updated: 02/17/23 0131     WBC 16 74 Thousand/uL      RBC 3 87 Million/uL      Hemoglobin 10 8 g/dL      Hematocrit 34 1 %      MCV 88 fL      MCH 27 9 pg      MCHC 31 7 g/dL      RDW 13 6 % MPV 11 0 fL      Platelets 970 Thousands/uL      nRBC 0 /100 WBCs      Neutrophils Relative 73 %      Immat GRANS % 2 %      Lymphocytes Relative 15 %      Monocytes Relative 7 %      Eosinophils Relative 2 %      Basophils Relative 1 %      Neutrophils Absolute 12 44 Thousands/µL      Immature Grans Absolute 0 30 Thousand/uL      Lymphocytes Absolute 2 50 Thousands/µL      Monocytes Absolute 1 16 Thousand/µL      Eosinophils Absolute 0 25 Thousand/µL      Basophils Absolute 0 09 Thousands/µL     Blood culture #2 [[id number]] Collected: 02/17/23 0116    Lab Status: In process Specimen: Blood from Hand, Left Updated: 02/17/23 0124    Blood culture #1 [[id number]] Collected: 02/17/23 0116    Lab Status:  In process Specimen: Blood from Hand, Left Updated: 02/17/23 0124    Fingerstick Glucose (POCT) [[id number]]  (Abnormal) Collected: 02/17/23 0052    Lab Status: Final result Updated: 02/17/23 0053     POC Glucose >500 mg/dl     UA (URINE) with reflex to Scope [[id number]]     Lab Status: No result Specimen: Urine     POCT pregnancy, urine [[id number]]     Lab Status: No result                  US kidney and bladder with pvr    (Results Pending)              Procedures  CriticalCare Time  Performed by: Hollie Michaud MD  Authorized by: Hollie Michaud MD     Critical care provider statement:     Critical care time (minutes):  35    Critical care time was exclusive of:  Separately billable procedures and treating other patients and teaching time    Critical care was necessary to treat or prevent imminent or life-threatening deterioration of the following conditions:  Sepsis and renal failure    Critical care was time spent personally by me on the following activities:  Obtaining history from patient or surrogate, development of treatment plan with patient or surrogate, evaluation of patient's response to treatment, examination of patient, ordering and performing treatments and interventions, ordering and review of laboratory studies, review of old charts and re-evaluation of patient's condition             ED Course  ED Course as of 02/17/23 0503   Fri Feb 17, 2023   0133 WBC(!): 16 74  Improved from 2 days ago   0133 BETA-HYDROXYBUTYRATE: 0 1   0151 EKG interpreted by myself demonstrates normal sinus rhythm with a rate of 74, normal axis, normal intervals, normal ST segment and T waves   0200 Magnesium(!): 1 5   0202 Glucose, Random(!!): 519   0203 Creatinine(!): 3 41                            Initial Sepsis Screening     Row Name 02/17/23 0207                Is the patient's history suggestive of a new or worsening infection? Yes (Proceed)  -RC        Suspected source of infection soft tissue  -RC        Indicate SIRS criteria Leukocytosis (WBC > 76642 IJL) OR Leukopenia (WBC <4000 IJL) OR Bandemia (WBC >10% bands)  -RC        Are two or more of the above signs & symptoms of infection both present and new to the patient? Yes (Proceed)  -RC        Assess for evidence of organ dysfunction: Are any of the below criteria present within 6 hours of suspected infection and SIRS criteria that are NOT considered to be chronic conditions? Lactate > 2  0;Creatinine > 2 0 AND > 0 5 above baseline  -RC        Date of presentation of severe sepsis 02/17/23  -RC        Time of presentation of severe sepsis 0208  -RC        Sepsis Note: Click "NEXT" below (NOT "close") to generate sepsis note based on above information  YES (proceed by clicking "NEXT")  -RC              User Key  (r) = Recorded By, (t) = Taken By, (c) = Cosigned By    [address] Name Provider Type    Armand Adler MD Physician                              Medical Decision Making  62-year-old female with diabetes presents for evaluation of abnormal outpatient labs  On exam, patient is resting comfortably in bed in no acute distress  Will recheck labs including sepsis work-up given concern for back abscess and surrounding cellulitis    Labs remarkable for hyperglycemia, acute renal failure, lactic acidosis, hypomagnesemia, and leukocytosis  Patient treated with IV fluids, broad-spectrum antibiotics, insulin, and magnesium  She will be admitted to medicine for further management and evaluation    Abscess of back: complicated acute illness or injury  Acute renal failure (ARF) ([facility] ): complicated acute illness or injury  Cellulitis: complicated acute illness or injury  Hypomagnesemia: self-limited or minor problem  Obesity: acute illness or injury  Sepsis [facility]): acute illness or injury  Uncontrolled diabetes mellitus with hyperglycemia ([place] [address] ): acute illness or injury  Amount and/or Complexity of Data Reviewed  External Data Reviewed: labs and notes  Labs: ordered  Decision-making details documented in ED Course  Risk  OTC drugs  Prescription drug management  Decision regarding hospitalization            Disposition  Final diagnoses:   Obesity   Abscess of back   Cellulitis   Acute renal failure (ARF) (HCC)   Sepsis ([place] [address] )   Hypomagnesemia   Uncontrolled diabetes mellitus with hyperglycemia ([place] [address] )     Time reflects when diagnosis was documented in both MDM as applicable and the Disposition within this note     Time User Action Codes Description Comment    2/17/2023 12:59 AM Check, [place] Add [E66 9] Obesity     2/17/2023 12:59 AM Check, [place] Add [L02 212] Abscess of back     2/17/2023  1:00 AM Check, [place] Add [L03 90] Cellulitis     2/17/2023  2:03 AM Check, [place] Add [N17 9] Acute renal failure (ARF) ([place] [address] )     2/17/2023  2:06 AM Check, [place] Add [A41 9] Sepsis ([place] [address] )     2/17/2023  2:06 AM Check, [place] Modify [E66 9] Obesity     2/17/2023  2:06 AM Check, [place] Modify [N17 9] Acute renal failure (ARF) ([place] [address] )     2/17/2023  2:07 AM Check, [place] Add [R73 9] Hyperglycemia     2/17/2023  2:07 AM Check, [place] Add [E83 42] Hypomagnesemia     2/17/2023  2:07 AM Check, [place] Add [E11 65] Uncontrolled diabetes mellitus with hyperglycemia ([facility] )     2/17/2023  2:07 AM Check, [place] Remove [R73 9] Hyperglycemia     2/17/2023  2:57 AM Ana ASIF Add [N17 9] Acute kidney injury [facility])       ED Disposition     ED Disposition   Admit    Condition   Stable    Date/Time   Fri Feb 17, 2023  2:52 AM    Comment   Case was discussed with BLADIMIR and the patient's admission status was agreed to be Admission Status: inpatient status to the service of Dr Alen Alfonso   Follow-up Information    None         Patient's Medications   Discharge Prescriptions    No medications on file       No discharge procedures on file      PDMP Review     None          ED Provider  Electronically Signed by           Carroll Malloy MD  02/17/23 4989       Carroll Malloy MD  02/17/23 [id number]

## 2023-02-17 NOTE — CONSULTS
LEAP (Lower Extremity Amputation Prevention Program) Consultation      Assessment:  Patient qualifies to be in the [facility] program based on risk factor evaluation    Patient agrees to be part of the program: yes    Plan:  -Patient requires the following outpatient referrals on discharge:  Podiatry    -Recommend starting the following medications: pending discussion with attending    -Patient educated on proper foot care    ________________________________________________________________________    Risk Factors:    yes Insulin dependent DM  - 3 points  yes History of wound/ulcer  - 3 points  yes Active smoker   - 2 points  no Former smoker   - 1 point  yes A1C>8    - 1 point  yes Hypertension    - 1 point  no Hyperlipidemia   - 1 point  no Coronary artery disease  - 1 point  no Peripheral arterial disease  - 1 point  no Diabetic retinopathy   - 1 point  yes Peripheral neuropathy  - 1 point    Total LEAP risk score: 11    Medications:  ASA: no  Other blood thinners: None  Statin: no  Oral diabetes medications: yes  Insulin: yes    Vitals:  BP (!) 104/49   Pulse 76   Temp 98 3 °F (36 8 °C) (Temporal)   Resp 16   SpO2 (!) 89%     Physical Exam:  Pulse Exam:   Right DP: nonpalpable  Right PT: nonpalpable  Left DP: nonpalpable  Left PT: nonpalpable  Neuro: Decreased foot sensation?: yes  Skin:    Stasis changes?: yes   Lower extremity Edema: yes  Wounds?: no    Social History:  Smoker: yes  Details: A pack a day for approximately twenty years    Past Medical History:  Past Medical History:   Diagnosis Date   • Abscess of left thigh    • Abscess, scalp    • Diabetes mellitus ([facility] )    • Diabetes mellitus, type 2 ([facility] )    • Hypertension    • Hypothyroidism    • Renal disorder    • Severe obesity (BMI >= 40) ([facility] )        Past Surgical History:  Past Surgical History:   Procedure Laterality Date   • ABCESS DRAINAGE  12/2022    scalp and thigh   • HIP SURGERY Left     fracture- june 2008   • SHOULDER SURGERY         Labs:   Lab Results   Component Value Date    WBC 16 74 (H) 02/17/2023    HGB 10 8 (L) 02/17/2023     (H) 02/17/2023    CREATININE 3 41 (H) 02/17/2023    EGFR 16 02/17/2023    INR 0 99 02/17/2023     No results found for: CHOL     Sherry Fleming PA-C

## 2023-02-17 NOTE — CONSULTS
[facility] 1985, 43 y o  female MRN: [id number]  Unit/Bed#: ED 28 Encounter: [id number]  Primary Care Provider: Norma Diana MD   Date and time admitted to hospital: 2/17/2023 12:40 AM    Inpatient consult to Acute Care Surgery  Consult performed by: Alexandre Antunez PA-C  Consult ordered by: Meena Gutierrez PA-C          Abscess of back  Assessment & Plan  Assessment:  Cellulitis and abscess of back  Plan:  Recommendation made for I&D bedside  Informed consent obtained  Procedure completed without complications  Wound culture obtained  Will order wound care twice daily  Antibiotics with MRSA coverage  MRSA decolonization protocol  Surgery will continue to follow  History of Present Illness   HPI:  Sima Ramirez is a 43 y o  female who presents with draining abscess on her back  Started 1 week ago, drainage x 2 days  Pain improving with drainage  No fever/chills  Blood sugars have been elevated  Started on antibiotics by PCP, has history MRSA skin infection  Review of Systems   Constitutional: Negative for chills and fever  Skin: Positive for rash and wound  All other systems reviewed and are negative        Historical Information   Past Medical History:   Diagnosis Date   • Abscess of left thigh    • Abscess, scalp    • Diabetes mellitus ([facility] [address] )    • Diabetes mellitus, type 2 (HCC)    • Hypertension    • Hypothyroidism    • Renal disorder    • Severe obesity (BMI >= 40) ([facility] [address] )      Past Surgical History:   Procedure Laterality Date   • ABCESS DRAINAGE  12/2022    scalp and thigh   • HIP SURGERY Left     fracture- june 2008   • SHOULDER SURGERY       Social History   Social History     Substance and Sexual Activity   Alcohol Use Never     Social History     Substance and Sexual Activity   Drug Use Yes   • Types: Marijuana     Social History     Tobacco Use   Smoking Status Every Day   • Packs/day: 1 00   • Years: 20 00   • Pack years: 20 00   • Types: Cigarettes   Smokeless Tobacco Never     E-Cigarette/Vaping     E-Cigarette/Vaping Substances     Family History:   Family History   Problem Relation Age of Onset   • Cancer Mother    • Diabetes Mother    • Cancer Father    • Other Father         aortic insufficiency       Meds/Allergies   PTA meds:   Prior to Admission Medications   Prescriptions Last Dose Informant Patient Reported? Taking? GNP Alcohol Swabs 70 % PADS   Yes No   Insulin Glargine Solostar (Lantus SoloStar) 100 UNIT/ML SOPN   No No   Sig: Inject 0 3 mL (30 Units total) under the skin daily   Metoprolol Tartrate 75 MG TABS   Yes No   Si mg every 12 (twelve) hours   amLODIPine (NORVASC) 2 5 mg tablet   No No   Sig: Take 3 tablets (7 5 mg total) by mouth daily   clindamycin (CLEOCIN) 300 MG capsule   No No   Sig: Take 1 capsule (300 mg total) by mouth 4 (four) times a day for 7 days   gabapentin (NEURONTIN) 600 MG tablet   Yes No   Si mg 2 (two) times a day   glucose blood test strip   Yes No   Sig: e11 65 (Type 2 Diabetes) Test daily before meals 3 times a day   insulin aspart (NovoLOG FlexPen ReliOn) 100 UNIT/ML injection pen   Yes No   Sig: TAKE EXTRA INSULIN BASED ON BG RESULTS PER SCALE PROVIDED (ISF 10-80 MG/DL OPTIONS)   E11 65 (TYPE 2 DIABETES) -200 = 1 UNIT HUMALOG -250 =<MORE>   metFORMIN (GLUCOPHAGE-XR) 500 mg 24 hr tablet   Yes No   Sig: Take 1,000 mg by mouth   valsartan-hydrochlorothiazide (DIOVAN-HCT) 320-25 MG per tablet   Yes No      Facility-Administered Medications: None     Allergies   Allergen Reactions   • Wasp Venom Anxiety, Headache, Itching, Rash and Swelling       Objective   First Vitals:   Blood Pressure: 131/61 (23)  Pulse: 83 (23)  Temperature: 98 3 °F (36 8 °C) (23)  Temp Source: Temporal (23)  Respirations: 18 (23)  SpO2: 93 % (23)    Current Vitals:   Blood Pressure: (!) 104/49 (23 0752)  Pulse: 76 (23 0622)  Temperature: 98 3 °F (36 8 °C) (02/17/23 0136)  Temp Source: Temporal (02/17/23 0136)  Respirations: 16 (02/17/23 0752)  SpO2: (!) 89 % (02/17/23 0752)      Intake/Output Summary (Last 24 hours) at 2/17/2023 1207  Last data filed at 2/17/2023 0412  Gross per 24 hour   Intake 2450 ml   Output --   Net 2450 ml       Invasive Devices     Peripheral Intravenous Line  Duration           Peripheral IV 02/17/23 Left;Proximal;Ventral (anterior) Forearm <1 day                Physical Exam  Vitals and nursing note reviewed  Constitutional:       General: She is not in acute distress  Appearance: She is well-developed  She is not diaphoretic  HENT:      Head: Normocephalic and atraumatic  Eyes:      Conjunctiva/sclera: Conjunctivae normal       Pupils: Pupils are equal, round, and reactive to light  Pulmonary:      Effort: No respiratory distress  Musculoskeletal:         General: Normal range of motion  Cervical back: Normal range of motion  Skin:     General: Skin is warm and dry  Capillary Refill: Capillary refill takes less than 2 seconds  Comments: Large area of cellulitis to mid back centrally located is an abscess with multiple areas of skin breakdown, slough, and purulent discharge  Warm and tender  Neurological:      Mental Status: She is alert and oriented to person, place, and time  Psychiatric:         Behavior: Behavior normal          Lab Results:   I have personally reviewed pertinent lab results    , CBC:   Lab Results   Component Value Date    WBC 16 74 (H) 02/17/2023    HGB 10 8 (L) 02/17/2023    HCT 34 1 (L) 02/17/2023    MCV 88 02/17/2023     (H) 02/17/2023    MCH 27 9 02/17/2023    MCHC 31 7 02/17/2023    RDW 13 6 02/17/2023    MPV 11 0 02/17/2023    NRBC 0 02/17/2023   , CMP:   Lab Results   Component Value Date    SODIUM 126 (L) 02/17/2023    K 3 9 02/17/2023    CL 89 (L) 02/17/2023    CO2 25 02/17/2023    BUN 45 (H) 02/17/2023    CREATININE 3 41 (H) 02/17/2023    CALCIUM 7 8 (L) 02/17/2023    AST 12 (L) 02/17/2023    ALT 16 02/17/2023    ALKPHOS 158 (H) 02/17/2023    EGFR 16 02/17/2023     Imaging: I have personally reviewed pertinent reports  EKG, Pathology, and Other Studies: I have personally reviewed pertinent reports  Code Status: Level 1 - Full Code  Advance Directive and Living Will:      Power of :    POLST:      Counseling / Coordination of Care  Total floor / unit time spent today 30 minutes  Greater than 50% of total time was spent with the patient and / or family counseling and / or coordination of care  A description of the counseling / coordination of care: procedure

## 2023-02-17 NOTE — PROCEDURES
Incision and drain    Date/Time: 2/17/2023 12:11 PM  Performed by: Mikal Antunez PA-C  Authorized by: Mikal Antunez PA-C   Universal Protocol:  Consent: Verbal consent obtained  Risks and benefits: risks, benefits and alternatives were discussed  Consent given by: patient  Patient understanding: patient states understanding of the procedure being performed  Patient consent: the patient's understanding of the procedure matches consent given  Required items: required blood products, implants, devices, and special equipment available      Location:     Type:  Abscess    Size:  10 cm    Location:  Trunk    Trunk location:  Back  Pre-procedure details:     Skin preparation:  Betadine  Anesthesia (see MAR for exact dosages): Anesthesia method:  Local infiltration    Local anesthetic:  Lidocaine 1% WITH epi  Procedure details:     Complexity:  Complex    Incision types:  Cruciate    Scalpel blade:  15    Approach:  Open    Incision depth:  Subcutaneous    Wound management:  Probed and deloculated and irrigated with saline    Drainage:  Purulent    Drainage amount:  Copious    Wound treatment:  Packing placed    Packing material: 4x4  Post-procedure details:     Patient tolerance of procedure: Tolerated well, no immediate complications  Comments:      Field block performed  Skin incised through devitalized skin and eventually with 3 point cruciate incision  Copious pus expressed  Good hemostasis  Packed and bandaged with bulky gauze ABD  Tolerated well, no immediate complications

## 2023-02-17 NOTE — ASSESSMENT & PLAN NOTE
Assessment:  Cellulitis and abscess of back  Plan:  Recommendation made for I&D bedside  Informed consent obtained  Procedure completed without complications  Wound culture obtained  Will order wound care twice daily  Antibiotics with MRSA coverage  MRSA decolonization protocol  Surgery will continue to follow

## 2023-02-18 ENCOUNTER — TELEPHONE (OUTPATIENT)
Dept: OTHER | Facility: HOSPITAL | Age: 38
End: 2023-02-18

## 2023-02-18 ENCOUNTER — HOME HEALTH ADMISSION (OUTPATIENT)
Dept: HOME HEALTH SERVICES | Facility: HOME HEALTHCARE | Age: 38
End: 2023-02-18

## 2023-02-18 VITALS
WEIGHT: 293 LBS | SYSTOLIC BLOOD PRESSURE: 129 MMHG | HEIGHT: 70 IN | DIASTOLIC BLOOD PRESSURE: 82 MMHG | RESPIRATION RATE: 22 BRPM | BODY MASS INDEX: 41.95 KG/M2 | OXYGEN SATURATION: 97 % | HEART RATE: 63 BPM | TEMPERATURE: 97.7 F

## 2023-02-18 DIAGNOSIS — N17.9 ACUTE KIDNEY INJURY (HCC): ICD-10-CM

## 2023-02-18 DIAGNOSIS — N18.31 STAGE 3A CHRONIC KIDNEY DISEASE (HCC): Primary | Chronic | ICD-10-CM

## 2023-02-18 LAB
ANION GAP SERPL CALCULATED.3IONS-SCNC: 6 MMOL/L (ref 4–13)
BUN SERPL-MCNC: 40 MG/DL (ref 5–25)
CALCIUM SERPL-MCNC: 8.5 MG/DL (ref 8.4–10.2)
CHLORIDE SERPL-SCNC: 95 MMOL/L (ref 96–108)
CO2 SERPL-SCNC: 30 MMOL/L (ref 21–32)
CREAT SERPL-MCNC: 2.51 MG/DL (ref 0.6–1.3)
ERYTHROCYTE [DISTWIDTH] IN BLOOD BY AUTOMATED COUNT: 13.7 % (ref 11.6–15.1)
GFR SERPL CREATININE-BSD FRML MDRD: 23 ML/MIN/1.73SQ M
GLUCOSE SERPL-MCNC: 242 MG/DL (ref 65–140)
GLUCOSE SERPL-MCNC: 249 MG/DL (ref 65–140)
GLUCOSE SERPL-MCNC: 288 MG/DL (ref 65–140)
HCT VFR BLD AUTO: 37.1 % (ref 34.8–46.1)
HGB BLD-MCNC: 11.4 G/DL (ref 11.5–15.4)
MAGNESIUM SERPL-MCNC: 2 MG/DL (ref 1.9–2.7)
MCH RBC QN AUTO: 27.3 PG (ref 26.8–34.3)
MCHC RBC AUTO-ENTMCNC: 30.7 G/DL (ref 31.4–37.4)
MCV RBC AUTO: 89 FL (ref 82–98)
MRSA NOSE QL CULT: NORMAL
PLATELET # BLD AUTO: 460 THOUSANDS/UL (ref 149–390)
PMV BLD AUTO: 10.4 FL (ref 8.9–12.7)
POTASSIUM SERPL-SCNC: 4.3 MMOL/L (ref 3.5–5.3)
RBC # BLD AUTO: 4.17 MILLION/UL (ref 3.81–5.12)
SODIUM SERPL-SCNC: 131 MMOL/L (ref 135–147)
VANCOMYCIN SERPL-MCNC: 11.2 UG/ML (ref 10–20)
WBC # BLD AUTO: 11.83 THOUSAND/UL (ref 4.31–10.16)

## 2023-02-18 RX ORDER — DOXYCYCLINE HYCLATE 100 MG/1
100 CAPSULE ORAL EVERY 12 HOURS SCHEDULED
Qty: 14 CAPSULE | Refills: 0 | Status: SHIPPED | OUTPATIENT
Start: 2023-02-18 | End: 2023-02-25

## 2023-02-18 RX ADMIN — INSULIN LISPRO 6 UNITS: 100 INJECTION, SOLUTION INTRAVENOUS; SUBCUTANEOUS at 11:50

## 2023-02-18 RX ADMIN — OXYCODONE HYDROCHLORIDE 10 MG: 10 TABLET ORAL at 07:09

## 2023-02-18 RX ADMIN — INSULIN LISPRO 4 UNITS: 100 INJECTION, SOLUTION INTRAVENOUS; SUBCUTANEOUS at 08:13

## 2023-02-18 RX ADMIN — SODIUM CHLORIDE, SODIUM GLUCONATE, SODIUM ACETATE, POTASSIUM CHLORIDE, MAGNESIUM CHLORIDE, SODIUM PHOSPHATE, DIBASIC, AND POTASSIUM PHOSPHATE 125 ML/HR: .53; .5; .37; .037; .03; .012; .00082 INJECTION, SOLUTION INTRAVENOUS at 00:45

## 2023-02-18 RX ADMIN — VANCOMYCIN HYDROCHLORIDE 1750 MG: 1 INJECTION, POWDER, LYOPHILIZED, FOR SOLUTION INTRAVENOUS at 10:41

## 2023-02-18 RX ADMIN — METOPROLOL TARTRATE 75 MG: 50 TABLET, FILM COATED ORAL at 08:41

## 2023-02-18 RX ADMIN — HEPARIN SODIUM 7500 UNITS: 5000 INJECTION INTRAVENOUS; SUBCUTANEOUS at 05:11

## 2023-02-18 RX ADMIN — INSULIN LISPRO 10 UNITS: 100 INJECTION, SOLUTION INTRAVENOUS; SUBCUTANEOUS at 11:50

## 2023-02-18 RX ADMIN — DOCUSATE SODIUM 100 MG: 100 CAPSULE, LIQUID FILLED ORAL at 08:41

## 2023-02-18 RX ADMIN — MUPIROCIN 1 APPLICATION.: 20 OINTMENT TOPICAL at 08:41

## 2023-02-18 RX ADMIN — GABAPENTIN 300 MG: 600 TABLET, FILM COATED ORAL at 08:41

## 2023-02-18 RX ADMIN — INSULIN LISPRO 10 UNITS: 100 INJECTION, SOLUTION INTRAVENOUS; SUBCUTANEOUS at 08:13

## 2023-02-18 NOTE — ASSESSMENT & PLAN NOTE
· BP stable while off valsartan/HCTZ  · Advised to continue to hold on discharge  · Continue home metoprolol  · Follow-up with PCP/nephrology for further blood pressure management

## 2023-02-18 NOTE — PLAN OF CARE
Problem: PAIN - ADULT  Goal: Verbalizes/displays adequate comfort level or baseline comfort level  Description: Interventions:  - Encourage patient to monitor pain and request assistance  - Assess pain using appropriate pain scale  - Administer analgesics based on type and severity of pain and evaluate response  - Implement non-pharmacological measures as appropriate and evaluate response  - Consider cultural and social influences on pain and pain management  - Notify physician/advanced practitioner if interventions unsuccessful or patient reports new pain  Outcome: Progressing     Problem: INFECTION - ADULT  Goal: Absence or prevention of progression during hospitalization  Description: INTERVENTIONS:  - Assess and monitor for signs and symptoms of infection  - Monitor lab/diagnostic results  - Monitor all insertion sites, i e  indwelling lines, tubes, and drains  - Monitor endotracheal if appropriate and nasal secretions for changes in amount and color  - [place] appropriate cooling/warming therapies per order  - Administer medications as ordered  - Instruct and encourage patient and family to use good hand hygiene technique  - Identify and instruct in appropriate isolation precautions for identified infection/condition  Outcome: Progressing  Goal: Absence of fever/infection during neutropenic period  Description: INTERVENTIONS:  - Monitor WBC    Outcome: Progressing     Problem: SAFETY ADULT  Goal: Patient will remain free of falls  Description: INTERVENTIONS:  - Educate patient/family on patient safety including physical limitations  - Instruct patient to call for assistance with activity   - Consult OT/PT to assist with strengthening/mobility   - Keep Call bell within reach  - Keep bed low and locked with side rails adjusted as appropriate  - Keep care items and personal belongings within reach  - Initiate and maintain comfort rounds  - Make Fall Risk Sign visible to staff  - Offer Toileting every 2 Hours, in advance of need  - Initiate/Maintain bed alarm  - Obtain necessary fall risk management equipment  - Apply yellow socks and bracelet for high fall risk patients  - Consider moving patient to room near nurses station  Outcome: Progressing  Goal: Maintain or return to baseline ADL function  Description: INTERVENTIONS:  -  Assess patient's ability to carry out ADLs; assess patient's baseline for ADL function and identify physical deficits which impact ability to perform ADLs (bathing, care of mouth/teeth, toileting, grooming, dressing, etc )  - Assess/evaluate cause of self-care deficits   - Assess range of motion  - Assess patient's mobility; develop plan if impaired  - Assess patient's need for assistive devices and provide as appropriate  - Encourage maximum independence but intervene and supervise when necessary  - Involve family in performance of ADLs  - Assess for home care needs following discharge   - Consider OT consult to assist with ADL evaluation and planning for discharge  - Provide patient education as appropriate  Outcome: Progressing  Goal: Maintains/Returns to pre admission functional level  Description: INTERVENTIONS:  - Perform BMAT or MOVE assessment daily    - Set and communicate daily mobility goal to care team and patient/family/caregiver  - Collaborate with rehabilitation services on mobility goals if consulted  - Perform Range of Motion 2 times a day  - Reposition patient every 2 hours    - Dangle patient 2 times a day  - Stand patient 2 times a day  - Ambulate patient 2 times a day  - Out of bed to chair 3 times a day   - Out of bed for meals 3 times a day  - Out of bed for toileting  - Record patient progress and toleration of activity level   Outcome: Progressing     Problem: DISCHARGE PLANNING  Goal: Discharge to home or other facility with appropriate resources  Description: INTERVENTIONS:  - Identify barriers to discharge w/patient and caregiver  - Arrange for needed discharge resources and transportation as appropriate  - Identify discharge learning needs (meds, wound care, etc )  - Refer to Case Management Department for coordinating discharge planning if the patient needs post-hospital services based on physician/advanced practitioner order or complex needs related to functional status, cognitive ability, or social support system  Outcome: Progressing     Problem: Knowledge Deficit  Goal: Patient/family/caregiver demonstrates understanding of disease process, treatment plan, medications, and discharge instructions  Description: Complete learning assessment and assess knowledge base    Interventions:  - Provide teaching at level of understanding  - Provide teaching via preferred learning methods  Outcome: Progressing     Problem: GENITOURINARY - ADULT  Goal: Maintains or returns to baseline urinary function  Description: INTERVENTIONS:  - Assess urinary function  - Encourage oral fluids to ensure adequate hydration if ordered  - Administer IV fluids as ordered to ensure adequate hydration  - Administer ordered medications as needed  - Offer frequent toileting  - Follow urinary retention protocol if ordered  Outcome: Progressing

## 2023-02-18 NOTE — DISCHARGE INSTR - AVS FIRST PAGE
Have repeat labs on Monday next week to monitor kidney function  Return to the ER for any worsening fever/chills, worsening drainage from back wound, or worsening pain  Wound care instructions (back abscess):  Remove existing dressing and packing from the wound  Irrigate wound with saline or soap/water  Clean surrounding skin with soap/water  Gently repack open wound with saline moistened 4x4 gauze leaving tail externally  Cover with additional 4x4, ABD, tape to collect drainage  Repeat twice daily for a few days, when discharge decreases transition to once daily  Keep follow-up with Dr Mateus Gross for Tuesday

## 2023-02-18 NOTE — ASSESSMENT & PLAN NOTE
· Patient with history of MRSA infection on scalp wound in December 2022  · Patient had I&D of back wound on 2/17/2023 by surgical team  · Given history of MRSA infection will discharge on doxycycline  · Patient advised to follow-up with PCP for final wound culture result  · Case management to set up home care  · Ambulatory referral to wound care center  · Outpatient follow-up with general surgery team

## 2023-02-18 NOTE — PROGRESS NOTES
Sima Ramirez is a 43 y o  female who is currently ordered Vancomycin IV with management by the Pharmacy Consult service  Relevant clinical data and objective / subjective history reviewed  Vancomycin Assessment:  Indication and Goal AUC/Trough: Soft tissue (goal -600, trough >10)  Clinical Status: worsening  Micro:   Staph aureus  Renal Function:  SCr: 2 51 mg/dL  CrCl: 52 mL/min  Renal replacement: Not on dialysis  Days of Therapy: 2  Current Dose: 3000mg IV once then 2500mg IV q24h  Vancomycin Plan:  New Dosinmg IV once then 1750mg IV daily PRN for random level is 15mcg/ml or less  Next Level:  @1300  Renal Function Monitoring: Daily BMP and UOP  Pharmacy will continue to follow closely for s/sx of nephrotoxicity, infusion reactions and appropriateness of therapy  BMP and CBC will be ordered per protocol  We will continue to follow the patient’s culture results and clinical progress daily      Sharon Merritt, Pharmacist

## 2023-02-18 NOTE — TELEPHONE ENCOUNTER
Patient discharged from [address] [place] [address] 2/18/2023  Patient is to do BMP on 2/20/2023  Hospital follow-up to be scheduled around 1 week

## 2023-02-18 NOTE — PROGRESS NOTES
[facility]  65   Progress Note Marlon Silva 1985, 43 y o  female MRN: [id number]  Unit/Bed#: -[facility] Encounter: [id number]  Primary Care Provider: Alli Tate MD   Date and time admitted to hospital: 2/17/2023 12:40 AM    Abscess of back  Assessment & Plan  Assessment:  Cellulitis and abscess of back  Plan:  Continue dressing changes 2-3 x daily per nursing for purulent output  Continue IV abx pending culture results, with MRSA coverage  MRSA decolonization protocol  Advised patient stay for ongoing wound care/ABX  CM consulted to establish VNA services to assist her mom with wound care  Has surgery follow-up Tuesday  Subjective/Objective   Chief Complaint: none    Subjective: pain improving, tolerating dressing changes    Objective: ongoing purulent discharge from wound    Blood pressure 129/82, pulse 63, temperature 97 7 °F (36 5 °C), resp  rate 22, height 5' 10" (1 778 m), weight (!) 171 kg (376 lb 8 7 oz), last menstrual period 01/11/2023, SpO2 97 %  ,Body mass index is 54 03 kg/m²  Intake/Output Summary (Last 24 hours) at 2/18/2023 1316  Last data filed at 2/18/2023 1300  Gross per 24 hour   Intake 720 ml   Output 500 ml   Net 220 ml       Invasive Devices     Peripheral Intravenous Line  Duration           Peripheral IV 02/17/23 Left;Proximal;Ventral (anterior) Forearm 1 day                Physical Exam  Vitals and nursing note reviewed  Constitutional:       General: She is not in acute distress  Appearance: She is well-developed  She is not diaphoretic  HENT:      Head: Normocephalic and atraumatic  Eyes:      Conjunctiva/sclera: Conjunctivae normal       Pupils: Pupils are equal, round, and reactive to light  Pulmonary:      Effort: No respiratory distress  Musculoskeletal:         General: Normal range of motion  Cervical back: Normal range of motion  Skin:     General: Skin is warm and dry        Capillary Refill: Capillary refill takes less than 2 seconds  Comments: Wound not inspected, just recently changed by RN  Neurological:      Mental Status: She is alert and oriented to person, place, and time  Psychiatric:         Behavior: Behavior normal            Lab, Imaging and other studies:  I have personally reviewed pertinent lab results    , CBC:   Lab Results   Component Value Date    WBC 11 83 (H) 02/18/2023    HGB 11 4 (L) 02/18/2023    HCT 37 1 02/18/2023    MCV 89 02/18/2023     (H) 02/18/2023    MCH 27 3 02/18/2023    MCHC 30 7 (L) 02/18/2023    RDW 13 7 02/18/2023    MPV 10 4 02/18/2023   , CMP:   Lab Results   Component Value Date    SODIUM 131 (L) 02/18/2023    K 4 3 02/18/2023    CL 95 (L) 02/18/2023    CO2 30 02/18/2023    BUN 40 (H) 02/18/2023    CREATININE 2 51 (H) 02/18/2023    CALCIUM 8 5 02/18/2023    EGFR 23 02/18/2023     VTE Pharmacologic Prophylaxis: Heparin  VTE Mechanical Prophylaxis: sequential compression device

## 2023-02-18 NOTE — ASSESSMENT & PLAN NOTE
Assessment:  Cellulitis and abscess of back  Plan:  Continue dressing changes 2-3 x daily per nursing for purulent output  Continue IV abx pending culture results, with MRSA coverage  MRSA decolonization protocol  Advised patient stay for ongoing wound care/ABX  CM consulted to establish VNA services to assist her mom with wound care  Has surgery follow-up Tuesday

## 2023-02-18 NOTE — DISCHARGE SUMMARY
Thierry 44  Discharge- Shay Michael 1985, 43 y o  female MRN: [id number]  Unit/Bed#: -01 Encounter: [id number]  Primary Care Provider: Angelina Sullivan MD   Date and time admitted to hospital: 2/17/2023 12:40 AM    * Acute kidney injury [facility])  Assessment & Plan  · Likely multifactorial secondary to uncontrolled diabetes mellitus, infection, medication side effect and dehydration  · Creatinine gradually improving with IV fluids  · Home valsartan/HCTZ discontinued  · Nephrology input appreciated  · Recommended patient stay an extra day to monitor kidney function however patient states that she wants to leave today and does not want to stay    Patient is willing to get blood work done next week to reevaluate kidney function  · Outpatient referral to nephrology provided    Abscess of back  Assessment & Plan  · Patient with history of MRSA infection on scalp wound in December 2022  · Patient had I&D of back wound on 2/17/2023 by surgical team  · Given history of MRSA infection will discharge on doxycycline  · Patient advised to follow-up with PCP for final wound culture result  · Case management to set up home care  · Ambulatory referral to wound care center  · Outpatient follow-up with general surgery team    Lactic acidosis  Assessment & Plan  · Secondary to dehydration and infection  · Improved with IV fluids    Hyponatremia  Assessment & Plan  · Improved with IV fluids    Hypomagnesemia  Assessment & Plan  · Improved with supplementation    Type 2 diabetes mellitus with diabetic neuropathy, with long-term current use of insulin (HCC)  Assessment & Plan    Lab Results   Component Value Date    HGBA1C 13 2 (H) 12/07/2022   · Poorly controlled diabetes with elevated BS on admission  · Continue insulin with Lantus and meal coverage  · Hold metformin until follow-up with PCP/nephrology  · Endocrinology referral provided on discharge    Hypothyroidism  Assessment & Plan  · Continue levothyroxine    Primary hypertension  Assessment & Plan  · BP stable while off valsartan/HCTZ  · Advised to continue to hold on discharge  · Continue home metoprolol  · Follow-up with PCP/nephrology for further blood pressure management    Class 3 severe obesity due to excess calories with serious comorbidity and body mass index (BMI) of 50 0 to 59 9 in adult [facility])  Assessment & Plan  · BMI greater than 50  · Healthy diet recommended with lifestyle modification  · Consider referral to bariatric services with PCP      Discharging Physician / Practitioner: Henri Goldmann, MD  PCP: Norma Diana MD  Admission Date:   Admission Orders (From admission, onward)     Ordered        02/17/23 0252  INPATIENT ADMISSION  Once                      Discharge Date: 02/18/23    Medical Problems     Resolved Problems  Date Reviewed: 2/17/2023          Resolved    Severe sepsis ([facility] [address] ) 2/17/2023     Resolved by  Meena Gutierrez PA-C          Consultations During Hospital Stay:  · Surgery, nephrology    Procedures Performed:   · Bedside I&D by surgical team    Significant Findings / Test Results:   · Abscess on back, acute kidney injury    Incidental Findings:   · None    Test Results Pending at Discharge (will require follow up): · None     Outpatient Tests Requested:  · Routine labs with PCP as outpatient    Complications:    • None    Reason for Admission: Acute kidney injury, back abscess    Hospital Course:     Sima Ramirez is a 43 y o  female patient who originally presented to the hospital on 2/17/2023 due to abnormal labs  Patient found to have acute kidney injury and started on IV fluids  Nephrotoxic meds were held  Creatinine gradually improving  Patient also with abscess on back, surgical team was consulted  Patient had bedside I&D performed  Patient was maintained on IV antibiotics in the hospital   Patient adamant about being discharged home today    Kidney function is improving however still not back to baseline  Wound culture with Staph aureus, final results pending  Patient does have a history of MRSA  Will discharge on doxycycline to complete course of therapy  Advised to follow-up with PCP for final wound culture results  Also referred to wound care center and general surgery as outpatient  Advised to return if she had any worsening symptoms  Case management to set up home care    Of note patient does have a history of noncompliance and glucose levels have been elevated  Patient counseled on importance of maintaining proper insulin regimen and taking medications appropriately  Ambulatory referral to endocrinology provided    Please see above list of diagnoses and related plan for additional information  Condition at Discharge: stable     Discharge Day Visit / Exam:     Subjective: No complaints at this time  Vitals: Blood Pressure: 129/82 (02/18/23 0700)  Pulse: 63 (02/18/23 0700)  Temperature: 97 7 °F (36 5 °C) (02/18/23 0700)  Temp Source: Temporal (02/17/23 0136)  Respirations: 22 (02/18/23 0700)  Height: 5' 10" (177 8 cm) (02/17/23 1645)  Weight - Scale: (!) 171 kg (376 lb 8 7 oz) (02/17/23 1645)  SpO2: 97 % (02/18/23 0700)  Exam:   Physical Exam  Constitutional:       General: She is not in acute distress  Appearance: She is obese  HENT:      Head: Normocephalic and atraumatic  Nose: Nose normal       Mouth/Throat:      Mouth: Mucous membranes are moist    Eyes:      Extraocular Movements: Extraocular movements intact  Conjunctiva/sclera: Conjunctivae normal    Cardiovascular:      Rate and Rhythm: Normal rate and regular rhythm  Pulmonary:      Effort: Pulmonary effort is normal  No respiratory distress  Abdominal:      Palpations: Abdomen is soft  Tenderness: There is no abdominal tenderness  Musculoskeletal:         General: Normal range of motion  Cervical back: Normal range of motion and neck supple        Comments: Generalized weakness   Skin: General: Skin is warm and dry  Comments: Wound on back with dressing in place   Neurological:      General: No focal deficit present  Mental Status: She is alert  Mental status is at baseline  Cranial Nerves: No cranial nerve deficit  Psychiatric:         Mood and Affect: Mood normal          Behavior: Behavior normal          Discussion with Family: Updated patient regarding discharge plan    Discharge instructions/Information to patient and family:   See after visit summary for information provided to patient and family  Provisions for Follow-Up Care:  See after visit summary for information related to follow-up care and any pertinent home health orders  Disposition:     Home with [facility] Services (Reminder: Complete face to face encounter)      Planned Readmission:   • no     Discharge Statement:  I spent 35 minutes discharging the patient  This time was spent on the day of discharge  I had direct contact with the patient on the day of discharge  Greater than 50% of the total time was spent examining patient, answering all patient questions, arranging and discussing plan of care with patient as well as directly providing post-discharge instructions  Additional time then spent on discharge activities  Discharge Medications:  See after visit summary for reconciled discharge medications provided to patient and family        ** Please Note: This note has been constructed using a voice recognition system **

## 2023-02-18 NOTE — ASSESSMENT & PLAN NOTE
Lab Results   Component Value Date    HGBA1C 13 2 (H) 12/07/2022   · Poorly controlled diabetes with elevated BS on admission  · Continue insulin with Lantus and meal coverage  · Hold metformin until follow-up with PCP/nephrology  · Endocrinology referral provided on discharge

## 2023-02-18 NOTE — NURSING NOTE
Discharge instructions reviewed with patient  Verbalized understanding  Left in stable condition with all belongings

## 2023-02-18 NOTE — PLAN OF CARE
Problem: INFECTION - ADULT  Goal: Absence or prevention of progression during hospitalization  Description: INTERVENTIONS:  - Assess and monitor for signs and symptoms of infection  - Monitor lab/diagnostic results  - Monitor all insertion sites, i e  indwelling lines, tubes, and drains  - Monitor endotracheal if appropriate and nasal secretions for changes in amount and color  - [place] appropriate cooling/warming therapies per order  - Administer medications as ordered  - Instruct and encourage patient and family to use good hand hygiene technique  - Identify and instruct in appropriate isolation precautions for identified infection/condition  2/18/2023 0713 by Angela Boucher RN  Outcome: Progressing  2/18/2023 0713 by Angela Boucher RN  Outcome: Progressing     Problem: PAIN - ADULT  Goal: Verbalizes/displays adequate comfort level or baseline comfort level  Description: Interventions:  - Encourage patient to monitor pain and request assistance  - Assess pain using appropriate pain scale  - Administer analgesics based on type and severity of pain and evaluate response  - Implement non-pharmacological measures as appropriate and evaluate response  - Consider cultural and social influences on pain and pain management  - Notify physician/advanced practitioner if interventions unsuccessful or patient reports new pain  2/18/2023 0713 by Angela Boucher RN  Outcome: Progressing  2/18/2023 0713 by Angela Boucher RN  Outcome: Progressing

## 2023-02-18 NOTE — PLAN OF CARE
Problem: PAIN - ADULT  Goal: Verbalizes/displays adequate comfort level or baseline comfort level  Description: Interventions:  - Encourage patient to monitor pain and request assistance  - Assess pain using appropriate pain scale  - Administer analgesics based on type and severity of pain and evaluate response  - Implement non-pharmacological measures as appropriate and evaluate response  - Consider cultural and social influences on pain and pain management  - Notify physician/advanced practitioner if interventions unsuccessful or patient reports new pain  2/18/2023 1337 by Chaz Colin RN  Outcome: Adequate for Discharge  2/18/2023 0713 by Chaz Colin RN  Outcome: Progressing  2/18/2023 0713 by Chaz Colin RN  Outcome: Progressing     Problem: INFECTION - ADULT  Goal: Absence or prevention of progression during hospitalization  Description: INTERVENTIONS:  - Assess and monitor for signs and symptoms of infection  - Monitor lab/diagnostic results  - Monitor all insertion sites, i e  indwelling lines, tubes, and drains  - Monitor endotracheal if appropriate and nasal secretions for changes in amount and color  - [place] appropriate cooling/warming therapies per order  - Administer medications as ordered  - Instruct and encourage patient and family to use good hand hygiene technique  - Identify and instruct in appropriate isolation precautions for identified infection/condition  2/18/2023 1337 by Chaz Colin RN  Outcome: Adequate for Discharge  2/18/2023 0713 by Chaz Colin RN  Outcome: Progressing  2/18/2023 0713 by Chaz Colin RN  Outcome: Progressing  Goal: Absence of fever/infection during neutropenic period  Description: INTERVENTIONS:  - Monitor WBC    2/18/2023 1337 by Chaz Colin RN  Outcome: Adequate for Discharge  2/18/2023 0713 by Chaz Colin RN  Outcome: Progressing  2/18/2023 0713 by Chaz Colin RN  Outcome: Progressing     Problem: SAFETY ADULT  Goal: Patient will remain free of falls  Description: INTERVENTIONS:  - Educate patient/family on patient safety including physical limitations  - Instruct patient to call for assistance with activity   - Consult OT/PT to assist with strengthening/mobility   - Keep Call bell within reach  - Keep bed low and locked with side rails adjusted as appropriate  - Keep care items and personal belongings within reach  - Initiate and maintain comfort rounds  - Make Fall Risk Sign visible to staff  - Offer Toileting every 2 Hours, in advance of need  - Initiate/Maintain bed alarm  - Obtain necessary fall risk management equipment  - Apply yellow socks and bracelet for high fall risk patients  - Consider moving patient to room near nurses station  2/18/2023 1337 by Nate Mesa RN  Outcome: Adequate for Discharge  2/18/2023 0713 by Nate Mesa RN  Outcome: Progressing  2/18/2023 0713 by Nate Mesa RN  Outcome: Progressing  Goal: Maintain or return to baseline ADL function  Description: INTERVENTIONS:  -  Assess patient's ability to carry out ADLs; assess patient's baseline for ADL function and identify physical deficits which impact ability to perform ADLs (bathing, care of mouth/teeth, toileting, grooming, dressing, etc )  - Assess/evaluate cause of self-care deficits   - Assess range of motion  - Assess patient's mobility; develop plan if impaired  - Assess patient's need for assistive devices and provide as appropriate  - Encourage maximum independence but intervene and supervise when necessary  - Involve family in performance of ADLs  - Assess for home care needs following discharge   - Consider OT consult to assist with ADL evaluation and planning for discharge  - Provide patient education as appropriate  2/18/2023 1337 by Nate Mesa RN  Outcome: Adequate for Discharge  2/18/2023 0713 by Nate Mesa RN  Outcome: Progressing  2/18/2023 0713 by Nate Mesa RN  Outcome: Progressing  Goal: Maintains/Returns to pre admission functional level  Description: INTERVENTIONS:  - Perform BMAT or MOVE assessment daily    - Set and communicate daily mobility goal to care team and patient/family/caregiver  - Collaborate with rehabilitation services on mobility goals if consulted  - Perform Range of Motion 2 times a day  - Reposition patient every 2 hours  - Dangle patient 2 times a day  - Stand patient 2 times a day  - Ambulate patient 2 times a day  - Out of bed to chair 3 times a day   - Out of bed for meals 3 times a day  - Out of bed for toileting  - Record patient progress and toleration of activity level   2/18/2023 1337 by Jeanne Kwan RN  Outcome: Adequate for Discharge  2/18/2023 0713 by Jeanne Kwan RN  Outcome: Progressing  2/18/2023 0713 by Jeanne Kwan RN  Outcome: Progressing     Problem: DISCHARGE PLANNING  Goal: Discharge to home or other facility with appropriate resources  Description: INTERVENTIONS:  - Identify barriers to discharge w/patient and caregiver  - Arrange for needed discharge resources and transportation as appropriate  - Identify discharge learning needs (meds, wound care, etc )  - Refer to Case Management Department for coordinating discharge planning if the patient needs post-hospital services based on physician/advanced practitioner order or complex needs related to functional status, cognitive ability, or social support system  2/18/2023 1337 by Jeanne Kwan RN  Outcome: Adequate for Discharge  2/18/2023 0713 by Jeanne Kwan RN  Outcome: Progressing  2/18/2023 0713 by Jeanne Kwan RN  Outcome: Progressing     Problem: Knowledge Deficit  Goal: Patient/family/caregiver demonstrates understanding of disease process, treatment plan, medications, and discharge instructions  Description: Complete learning assessment and assess knowledge base    Interventions:  - Provide teaching at level of understanding  - Provide teaching via preferred learning methods  2/18/2023 1337 by Jeanne Kwan RN  Outcome: Adequate for Discharge  2/18/2023 0713 by Jeanne Kwan RN  Outcome: Progressing  2/18/2023 0713 by Reji Broderick, RN  Outcome: Progressing     Problem: GENITOURINARY - ADULT  Goal: Maintains or returns to baseline urinary function  Description: INTERVENTIONS:  - Assess urinary function  - Encourage oral fluids to ensure adequate hydration if ordered  - Administer IV fluids as ordered to ensure adequate hydration  - Administer ordered medications as needed  - Offer frequent toileting  - Follow urinary retention protocol if ordered  2/18/2023 1337 by Reji Broderick, RN  Outcome: Adequate for Discharge  2/18/2023 0713 by Reji Broderick RN  Outcome: Progressing  2/18/2023 0713 by Reji Broderick, RN  Outcome: Progressing

## 2023-02-18 NOTE — ASSESSMENT & PLAN NOTE
· Likely multifactorial secondary to uncontrolled diabetes mellitus, infection, medication side effect and dehydration  · Creatinine gradually improving with IV fluids  · Home valsartan/HCTZ discontinued  · Nephrology input appreciated  · Recommended patient stay an extra day to monitor kidney function however patient states that she wants to leave today and does not want to stay    Patient is willing to get blood work done next week to reevaluate kidney function  · Outpatient referral to nephrology provided

## 2023-02-19 ENCOUNTER — HOME CARE VISIT (OUTPATIENT)
Dept: HOME HEALTH SERVICES | Facility: HOME HEALTHCARE | Age: 38
End: 2023-02-19

## 2023-02-19 VITALS
SYSTOLIC BLOOD PRESSURE: 138 MMHG | DIASTOLIC BLOOD PRESSURE: 80 MMHG | TEMPERATURE: 97.6 F | RESPIRATION RATE: 20 BRPM | OXYGEN SATURATION: 98 % | HEART RATE: 69 BPM

## 2023-02-19 LAB
BACTERIA WND AEROBE CULT: ABNORMAL
BACTERIA WND AEROBE CULT: ABNORMAL
CREAT UR-MCNC: 84.4 MG/DL
GRAM STN SPEC: ABNORMAL
UUN 24H UR-MCNC: 308 MG/DL

## 2023-02-19 NOTE — CASE MANAGEMENT
Case Management Assessment & Discharge Planning Note    Patient name Shay [facility]  Location [address] 220/-01 MRN [id number]  : 1985 Date 2023       Current Admission Date: 2023  Current Admission Diagnosis:Acute kidney injury [facility])   Patient Active Problem List    Diagnosis Date Noted   • Acute kidney injury ([facility] [address] ) 2023   • Hypomagnesemia 2023   • Hyponatremia 2023   • Lactic acidosis 2023   • Marijuana use 02/15/2023   • Abscess of back 02/15/2023   • Primary hypertension 02/15/2023   • Hypothyroidism 02/15/2023   • Type 2 diabetes mellitus with diabetic neuropathy, with long-term current use of insulin ([facility] [address] ) 02/15/2023   • Pure hypercholesterolemia 02/15/2023   • Stage 3a chronic kidney disease ([facility] [address] ) 02/15/2023   • Arterial insufficiency of lower extremity ([facility] [address] ) 2023   • Class 3 severe obesity due to excess calories with serious comorbidity and body mass index (BMI) of 50 0 to 59 9 in adult ([facility] [address] ) 2023      LOS (days): 1  Geometric Mean LOS (GMLOS) (days): 3 10  Days to GMLOS:1 6     OBJECTIVE:    Risk of Unplanned Readmission Score: 20 09         Current admission status: Inpatient  Referral Reason: Other (d/c planning)    Preferred Pharmacy:   [address] [place] [address] [address] [place] [id number]  Phone: [phone] Fax: [phone]    Primary Care Provider: Angelina Sullivan MD    Primary Insurance: MEDICARE  Secondary Insurance: [address] [place] [address]    ASSESSMENT:  [address] [facility] Representative - Mother   Primary Phone: [phone] (Mobile)               Advance Directives  Does patient have a [address]?: No  Was patient offered paperwork?: Yes (pt declined paperwork)  Does patient have Advance Directives?: No  Was patient offered paperwork?: Yes (pt declined paperwork)         Readmission [facility]  30 Day Readmission: No    Patient Information  Admitted from[de-identified] Home  Mental Status: Alert  During Assessment patient was accompanied by: Not accompanied during assessment  Assessment information provided by[de-identified] Patient  Primary Caregiver: Self  Support Systems: Parent  [place] of Residence: [address] [place] [address] do you live in?: [address] entry access options  Select all that apply : Stairs  Number of steps to enter home  : 1  Do the steps have railings?: No  Type of Current Residence: [address] (1 story with 12 basement steps)  In the last 12 months, was there a time when you were not able to pay the mortgage or rent on time?: No  In the last 12 months, how many places have you lived?: 1  In the last 12 months, was there a time when you did not have a steady place to sleep or slept in a shelter (including now)?: No  Homeless/housing insecurity resource given?: N/A  Living Arrangements: Lives w/ Parent(s)  Is patient a ?: No    Activities of Daily Living Prior to Admission  Functional Status: Independent  Completes ADLs independently?: Yes  Ambulates independently?: Yes  Does patient use assisted devices?: No  Does patient currently own DME?: Yes  What DME does the patient currently own?: [employer], [employer], Other (Comment), Nebulizer, Bedside Commode, Stair Chair/Glide (knee skooter, bp cuff, glucometer)  Does patient have a history of Outpatient Therapy (PT/OT)?: No  Does the patient have a history of Short-Term Rehab?: No  Does patient have a history of HHC?: Yes ([address] [facility])  Does patient currently have [facility]?: No         Patient Information Continued  Income Source: SSI/SSD (disabled)  Does patient have prescription coverage?: Yes ([address])  Within the past 12 months, you worried that your food would run out before you got the money to buy more : Never true  Within the past 12 months, the food you bought just didn't last and you didn't have money to get more : Never true  Food insecurity resource given?: N/A  Does patient receive dialysis treatments?: No  Does patient have a history of substance abuse?: No  Does patient have a history of Mental Health Diagnosis?: No         Means of Transportation  Means of Transport to Appts[de-identified] Drives Self  In the past 12 months, has lack of transportation kept you from medical appointments or from getting medications?: No  In the past 12 months, has lack of transportation kept you from meetings, work, or from getting things needed for daily living?: No  Was application for public transport provided?: N/A        DISCHARGE DETAILS:    Discharge planning discussed with[de-identified] patient and mother was called at 13:53 pm  and she called cm back at 16:08pm  Freedom of Choice: Yes  Comments - Freedom of Choice: cm was asked by surgery to arrange [facility] for this pt- permission was given to send  blanket referral for hhc  CM contacted family/caregiver?: Yes  Were Treatment Team discharge recommendations reviewed with patient/caregiver?: Yes  Did patient/caregiver verbalize understanding of patient care needs?: Yes  Were patient/caregiver advised of the risks associated with not following Treatment Team discharge recommendations?: Yes    Contacts  Patient Contacts: Yunier Rosenberg  Relationship to Patient[de-identified] Family (mother)  Contact Method: Phone  Phone Number: [phone]  Reason/Outcome: Discharge [address]         Is the patient interested in [address] at discharge?: Yes         Other Referral/Resources/Interventions Provided:  Interventions: [address]  Referral Comments: pt did was anxious to be d/c - cm called [facility] at 14:33pm with the accepting  hhc- she was made aware [facility] was not able to accept at this time- pt requested emi    Would you like to participate in our [address] service program?  : No - Declined    Treatment Team Recommendation: Home with [address] (home with family and slvna& outpt follow up- father)  Discharge Destination Plan[de-identified] Home with [address] [facility] (home with family & slvna & outpt follow up)                                      Family notified[de-identified] mother was called

## 2023-02-19 NOTE — CASE COMMUNICATION
Ship to     Home   Insurance medicare     Wound 1 mid back      Full     Saline 100ml [id number]  4    Gauze 4x4 ST [id number]  30  ABD 5x9 [id number]   46

## 2023-02-19 NOTE — CASE COMMUNICATION
Ship to  [place]    Wound 1 mid back     Gauze 4x4 ST [id number]  ABD 5x9 [id number]  Tape   Mefix 2inch [id number] 1

## 2023-02-19 NOTE — CASE COMMUNICATION
[facility] has admitted your patient to [facility] service with the following disciplines:      SN  This report is informational only, no responses is needed  Primary focus of home health care wound care   Patient stated goals of care wound healing and improve kidney function  Anticipated visit pattern and next visit date 2w9 Next visit planned for Thurs 2/23/23  See medication list - meds in home differ from AVS NA  Significant cl inical findings NA  Potential barriers to goal achievement comorbidities  Other pertinent information NA  Thank you for allowing us to participate in the care of your patient

## 2023-02-20 ENCOUNTER — APPOINTMENT (OUTPATIENT)
Dept: LAB | Facility: HOSPITAL | Age: 38
End: 2023-02-20

## 2023-02-20 ENCOUNTER — TRANSITIONAL CARE MANAGEMENT (OUTPATIENT)
Dept: FAMILY MEDICINE CLINIC | Facility: CLINIC | Age: 38
End: 2023-02-20

## 2023-02-20 DIAGNOSIS — N17.9 ACUTE KIDNEY INJURY (HCC): ICD-10-CM

## 2023-02-20 LAB
ANION GAP SERPL CALCULATED.3IONS-SCNC: 8 MMOL/L (ref 4–13)
BUN SERPL-MCNC: 21 MG/DL (ref 5–25)
CALCIUM SERPL-MCNC: 9.5 MG/DL (ref 8.4–10.2)
CHLORIDE SERPL-SCNC: 92 MMOL/L (ref 96–108)
CO2 SERPL-SCNC: 32 MMOL/L (ref 21–32)
CREAT SERPL-MCNC: 1.44 MG/DL (ref 0.6–1.3)
GFR SERPL CREATININE-BSD FRML MDRD: 45 ML/MIN/1.73SQ M
GLUCOSE P FAST SERPL-MCNC: 467 MG/DL (ref 65–99)
POTASSIUM SERPL-SCNC: 5.3 MMOL/L (ref 3.5–5.3)
SODIUM 24H UR-SCNC: 42 MOL/L
SODIUM SERPL-SCNC: 132 MMOL/L (ref 135–147)

## 2023-02-20 RX ORDER — LANCETS 33 GAUGE
EACH MISCELLANEOUS
COMMUNITY
Start: 2023-01-25

## 2023-02-20 RX ORDER — VALSARTAN AND HYDROCHLOROTHIAZIDE 320; 25 MG/1; MG/1
1 TABLET, FILM COATED ORAL DAILY
COMMUNITY
Start: 2022-12-29 | End: 2023-02-23

## 2023-02-20 RX ORDER — SULFAMETHOXAZOLE AND TRIMETHOPRIM 800; 160 MG/1; MG/1
TABLET ORAL
COMMUNITY
Start: 2022-12-08 | End: 2023-02-23

## 2023-02-20 RX ORDER — AMLODIPINE BESYLATE 10 MG/1
10 TABLET ORAL DAILY
COMMUNITY
Start: 2023-01-06 | End: 2023-02-27 | Stop reason: ALTCHOICE

## 2023-02-20 RX ORDER — IBUPROFEN 600 MG/1
TABLET ORAL
COMMUNITY
Start: 2022-12-08 | End: 2023-02-23

## 2023-02-21 ENCOUNTER — TELEPHONE (OUTPATIENT)
Dept: ENDOCRINOLOGY | Facility: CLINIC | Age: 38
End: 2023-02-21

## 2023-02-21 ENCOUNTER — CONSULT (OUTPATIENT)
Dept: SURGERY | Facility: CLINIC | Age: 38
End: 2023-02-21

## 2023-02-21 VITALS
OXYGEN SATURATION: 94 % | HEIGHT: 70 IN | TEMPERATURE: 97.4 F | WEIGHT: 293 LBS | DIASTOLIC BLOOD PRESSURE: 90 MMHG | BODY MASS INDEX: 41.95 KG/M2 | RESPIRATION RATE: 20 BRPM | SYSTOLIC BLOOD PRESSURE: 160 MMHG | HEART RATE: 78 BPM

## 2023-02-21 DIAGNOSIS — L02.212 ABSCESS OF BACK: ICD-10-CM

## 2023-02-21 NOTE — PATIENT INSTRUCTIONS
Please continue to shower twice a day, wash the wound with Chlorhexidine, rinse with saline, place vaseline or Remedy cream around the edge, then open up a 4 x 4 gauze and tuck into the wound and then cover with dry gauze and ABD pad    Continue with bactrim (oral antibiotic) until finished    Follow up in office in one week    Call if you are having any problems

## 2023-02-21 NOTE — TELEPHONE ENCOUNTER
Received referral for [facility]  Left voicemail for patient to contact office to schedule Consult/New Patient Appointment

## 2023-02-22 LAB
BACTERIA BLD CULT: NORMAL
BACTERIA BLD CULT: NORMAL

## 2023-02-22 NOTE — ASSESSMENT & PLAN NOTE
Washed with hibiclens, irrigated with wound cleanser, and packed gently with cotton gauze dressing  Continues on Doxycycline and Bactrim    Follow up in one week  Wound Culture 3+ Growth of Methicillin Resistant Staphylococcus aureus Abnormal     This organism has been edited  The previous result was Staphylococcus aureus on 2/18/2023 at 1219 EST  Please note:  This patient requires contact precautions             GRAM STAIN RESULT   Abnormal   2+ Polys      1+ Gram positive cocci in chains and clusters              Susceptibility     Methicillin Resistant Staphylococcus aureus     SILVINO     Ampicillin ($$) Resistant     Cefazolin ($) Resistant     Clindamycin ($) Susceptible     Erythromycin ($$$$) Resistant     Gentamicin ($$) Susceptible     Oxacillin Resistant     Tetracycline Susceptible     Trimethoprim + Sulfamethoxazole ($$$) Susceptible     Vancomycin ($) Susceptible

## 2023-02-22 NOTE — PROGRESS NOTES
Assessment/Plan:    No problem-specific Assessment & Plan notes found for this encounter  Diagnoses and all orders for this visit:    Abscess of back  -     Ambulatory Referral to General Surgery  -     Ambulatory Referral to General Surgery    Other orders  -     amLODIPine (NORVASC) 10 mg tablet; Take 10 mg by mouth daily  -     ibuprofen (MOTRIN) 600 mg tablet  -     Comfort EZ Pen Needles 32G X 4 MM MISC  -     valsartan-hydrochlorothiazide (DIOVAN-HCT) 320-25 MG per tablet; Take 1 tablet by mouth daily  -     sulfamethoxazole-trimethoprim (BACTRIM DS) 800-160 mg per tablet  -     mupirocin (BACTROBAN) 2 % ointment  -     Lancets 30G MISC          Subjective:      Patient ID: Leslye Bermudez is a 43 y o  female  Patient is a 65-year-old white female with history of prior MRSA infection, who was recently admitted with acute kidney injury associated with MRSA soft tissue infection of her right back  The patient was discharged from the hospital 3 days ago, and has been receiving daily wound care, and oral doxycycline and Bactrim  The wound measures 3 4 cm in length, 3 7 cm in width, and 2 cm in depth  There is surrounding erythema, but the patient reports that the pain is much improved  The wound was washed with Hibiclens, and redressed with gauze placed to the bottom of the wound to help wick away the drainage  The patient will continue with local wound care supervised by the visiting nurses, and follow-up next week  The following portions of the patient's history were reviewed and updated as appropriate: allergies, current medications, past family history, past medical history, past social history, past surgical history and problem list     Review of Systems   Constitutional: Negative for chills and fever  Skin: Positive for wound           Objective:      /90 (BP Location: Right arm, Patient Position: Sitting, Cuff Size: Large)   Pulse 78   Temp (!) 97 4 °F (36 3 °C) (Temporal) Resp 20   Ht 5' 10" (1 778 m)   Wt (!) 171 kg (376 lb)   SpO2 94%   BMI 53 95 kg/m²          Physical Exam  Constitutional:       General: She is not in acute distress  Appearance: She is not ill-appearing     Skin:

## 2023-02-23 ENCOUNTER — HOME CARE VISIT (OUTPATIENT)
Dept: HOME HEALTH SERVICES | Facility: HOME HEALTHCARE | Age: 38
End: 2023-02-23

## 2023-02-23 ENCOUNTER — OFFICE VISIT (OUTPATIENT)
Dept: NEPHROLOGY | Facility: CLINIC | Age: 38
End: 2023-02-23

## 2023-02-23 VITALS
WEIGHT: 293 LBS | OXYGEN SATURATION: 98 % | HEART RATE: 64 BPM | BODY MASS INDEX: 41.95 KG/M2 | HEIGHT: 70 IN | SYSTOLIC BLOOD PRESSURE: 168 MMHG | DIASTOLIC BLOOD PRESSURE: 74 MMHG

## 2023-02-23 DIAGNOSIS — Z79.4 TYPE 2 DIABETES MELLITUS WITH DIABETIC NEUROPATHY, WITH LONG-TERM CURRENT USE OF INSULIN (HCC): Chronic | ICD-10-CM

## 2023-02-23 DIAGNOSIS — N18.31 STAGE 3A CHRONIC KIDNEY DISEASE (HCC): Primary | Chronic | ICD-10-CM

## 2023-02-23 DIAGNOSIS — N17.9 ACUTE RENAL FAILURE (ARF) (HCC): ICD-10-CM

## 2023-02-23 DIAGNOSIS — N17.9 ACUTE KIDNEY INJURY (HCC): ICD-10-CM

## 2023-02-23 DIAGNOSIS — E11.40 TYPE 2 DIABETES MELLITUS WITH DIABETIC NEUROPATHY, WITH LONG-TERM CURRENT USE OF INSULIN (HCC): Chronic | ICD-10-CM

## 2023-02-23 DIAGNOSIS — E66.01 CLASS 3 SEVERE OBESITY DUE TO EXCESS CALORIES WITH SERIOUS COMORBIDITY AND BODY MASS INDEX (BMI) OF 50.0 TO 59.9 IN ADULT (HCC): Chronic | ICD-10-CM

## 2023-02-23 DIAGNOSIS — I10 PRIMARY HYPERTENSION: Chronic | ICD-10-CM

## 2023-02-23 NOTE — ASSESSMENT & PLAN NOTE
Blood pressure elevated  Prehospital amlodipine, valsartan, hydrochlorothiazide on hold  Patient still recovering from MACARIO  Restart amlodipine 10 mg daily  Monitor blood pressures  See patient instructions for further details
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
## 2023-02-23 NOTE — PATIENT INSTRUCTIONS
We reviewed your kidney function  This has improved from a GFR of 16 in the hospital to up to 39 most recently  Your baseline is around 60, so your kidneys are still healing  Because of this, we are going to continue to hold your valsartan/hydrochlorothiazide  Your blood pressure is elevated, which is because you have multiple blood pressure medications on hold  As discussed above, we are going to wait on the valsartan/hydrochlorothiazide until the kidneys are totally better  So in the interim, working to restart the amlodipine 10 mg daily for the sake of controlling the blood pressure  Please continue to monitor your blood pressures at home so we can discuss how they are doing on the amlodipine  We are going to monitor your kidney function in a few weeks to see the status of the kidneys at that time  We will make further changes to your medications based on the results of those labs at that time  Please avoid NSAIDs (nonsteroidal anti-inflammatory drugs), such as Advil (ibuprofen), Aleve (naproxen), Naprosyn, BCs, Goody's powder  Tylenol (acetaminophen) is a safer option for the kidneys  Do not exceed the maximum dose of acetaminophen  Note that this may be in combination with other drugs NSAID medications  Please avoid herbal supplements, such as turmeric  Please consult your nephrologist before taking any over-the-counter medications
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
## 2023-02-23 NOTE — ASSESSMENT & PLAN NOTE
Lab Results   Component Value Date    HGBA1C 13 2 (H) 12/07/2022   Okay to restart metformin from renal standpoint  Improve glycemic control  Discussed risk factor for diabetes  May eventually wish to consider SGLT2 inhibitor, but hold at this time given MACARIO

## 2023-02-24 VITALS
SYSTOLIC BLOOD PRESSURE: 160 MMHG | HEART RATE: 70 BPM | TEMPERATURE: 97 F | DIASTOLIC BLOOD PRESSURE: 80 MMHG | RESPIRATION RATE: 20 BRPM | OXYGEN SATURATION: 97 %

## 2023-02-27 ENCOUNTER — HOME CARE VISIT (OUTPATIENT)
Dept: HOME HEALTH SERVICES | Facility: HOME HEALTHCARE | Age: 38
End: 2023-02-27

## 2023-02-27 NOTE — CASE COMMUNICATION
[employer]  [facility] [employer]    Wound 1 full  Tape   Mefix 2inch [id number]    1

## 2023-02-28 VITALS
DIASTOLIC BLOOD PRESSURE: 88 MMHG | SYSTOLIC BLOOD PRESSURE: 132 MMHG | RESPIRATION RATE: 18 BRPM | HEART RATE: 82 BPM | TEMPERATURE: 97.6 F | OXYGEN SATURATION: 98 %

## 2023-03-02 ENCOUNTER — HOME CARE VISIT (OUTPATIENT)
Dept: HOME HEALTH SERVICES | Facility: HOME HEALTHCARE | Age: 38
End: 2023-03-02

## 2023-03-06 ENCOUNTER — HOME CARE VISIT (OUTPATIENT)
Dept: HOME HEALTH SERVICES | Facility: HOME HEALTHCARE | Age: 38
End: 2023-03-06

## 2023-03-06 RX ORDER — AMLODIPINE BESYLATE 10 MG/1
TABLET ORAL
COMMUNITY

## 2023-03-07 ENCOUNTER — OFFICE VISIT (OUTPATIENT)
Dept: SURGERY | Facility: CLINIC | Age: 38
End: 2023-03-07

## 2023-03-07 VITALS — TEMPERATURE: 97.5 F

## 2023-03-07 DIAGNOSIS — L02.212 ABSCESS OF BACK: Primary | ICD-10-CM

## 2023-03-07 NOTE — PATIENT INSTRUCTIONS
Continue to wash the wound with Hibiclens and dress with gauze dressing Twice a day  If there is not so much drainage, you can go to dressing changes once a day  Call if you are having any problems  Follow up Monday 3/13 in [place]

## 2023-03-07 NOTE — PROGRESS NOTES
Assessment/Plan:    Abscess of back      inflammation resolved  Loose slough removed with scissor and forceps  Continue local wound care  Diagnoses and all orders for this visit:    Abscess of back    Other orders  -     amLODIPine (NORVASC) 10 mg tablet  -     Debridement          Subjective:      Patient ID: Mila Burnham is a 43 y o  female  The patient presents in follow-up regarding MRSA abscess of her mid back  The surrounding cellulitis has resolved, the wound is austen and granulating  Loose debris was removed with scissor and forcep, and it was redressed with moist gauze  No further antibiotics appear warranted  She will follow-up next week in [place]  The following portions of the patient's history were reviewed and updated as appropriate: allergies, current medications, past family history, past medical history, past social history, past surgical history and problem list     Review of Systems   Constitutional: Negative for chills and fever  Skin: Positive for wound (decreasing amount of drainage)  Objective:      Temp 97 5 °F (36 4 °C) (Temporal)          Physical Exam  Skin:               Debridement   Universal Protocol:  Consent: Verbal consent obtained  Consent given by: patient  Time out: Immediately prior to procedure a "time out" was called to verify the correct patient, procedure, equipment, support staff and site/side marked as required    Patient understanding: patient states understanding of the procedure being performed  Patient identity confirmed: verbally with patient      Performed by: physician  Debridement type: selective  Pain control: none  Pre-debridement measurements  Length (cm): 3 5  Width (cm): 2 4  Depth (cm): 1 8  Surface Area (cm^2): 8 4  Volume (cm^3): 15 12    Post-debridement measurements  Length (cm): 3 5  Width (cm): 2 4  Depth (cm): 1 85  Percent debrided: 25%  Surface Area (cm^2): 8 4  Area debrided (cm^2): 2 1  Volume (cm^3): 15 54  Devitalized tissue debrided: exudate and slough  Instrument(s) utilized: forceps and scissors  Bleeding: small  Hemostasis obtained with: pressure  Procedural pain (0-10): 1  Post-procedural pain: 1   Response to treatment: procedure was tolerated well

## 2023-03-09 ENCOUNTER — HOME CARE VISIT (OUTPATIENT)
Dept: HOME HEALTH SERVICES | Facility: HOME HEALTHCARE | Age: 38
End: 2023-03-09

## 2023-03-11 ENCOUNTER — HOME CARE VISIT (OUTPATIENT)
Dept: HOME HEALTH SERVICES | Facility: HOME HEALTHCARE | Age: 38
End: 2023-03-11

## 2023-03-11 NOTE — CASE COMMUNICATION
Ship to    Home    Insurance [facility] [employer]    Wound 1 Full    All items are ordered by the each unless otherwise noted    PLEASE DO NOT ORDER BY THE BOX  Request specific quantity needed  For Private Insurances order should be for a 2 week period    Cleansers  Saline [place]  NOT STOCKED  [id number]       1    Dry Dressings   Gauze 4x4 N/S 200 4x4s per unit  [id number]     1    Gauze 4x4 ST [id number]    10      Tape  Transpore white 1 in [id number]      1

## 2023-03-13 ENCOUNTER — HOME CARE VISIT (OUTPATIENT)
Dept: HOME HEALTH SERVICES | Facility: HOME HEALTHCARE | Age: 38
End: 2023-03-13

## 2023-03-13 ENCOUNTER — OFFICE VISIT (OUTPATIENT)
Dept: SURGERY | Facility: CLINIC | Age: 38
End: 2023-03-13

## 2023-03-13 VITALS
DIASTOLIC BLOOD PRESSURE: 80 MMHG | RESPIRATION RATE: 20 BRPM | OXYGEN SATURATION: 95 % | HEART RATE: 84 BPM | SYSTOLIC BLOOD PRESSURE: 132 MMHG | TEMPERATURE: 98.6 F

## 2023-03-13 VITALS — TEMPERATURE: 97.1 F

## 2023-03-13 DIAGNOSIS — L02.212 ABSCESS OF BACK: Primary | ICD-10-CM

## 2023-03-13 NOTE — ASSESSMENT & PLAN NOTE
Healing with BID wound care  Measures 3 1 x 1 3 cm with 1 6 cm depth and 0 4 cm undermining at upper pole    Washed with hibiclens, irrigated and re dressed, to use 2x2 gauze for dressing changes  F/U in 2 weeks

## 2023-03-13 NOTE — PROGRESS NOTES
Assessment/Plan:    Abscess of back      Healing with BID wound care  Measures 3 1 x 1 3 cm with 1 6 cm depth and 0 4 cm undermining at upper pole    Washed with hibiclens, irrigated and re dressed, to use 2x2 gauze for dressing changes  F/U in 2 weeks  There are no diagnoses linked to this encounter  Subjective:      Patient ID: Isabel Moreno is a 43 y o  female  The patient is a 78-year-old white female who is following up for a wound of her mid back following incision and drainage of an MRSA abscess  The infection has cleared, and the wound is healing  The following portions of the patient's history were reviewed and updated as appropriate: allergies, current medications, past family history, past medical history, past social history, past surgical history and problem list     Review of Systems   Constitutional: Negative for chills and fever  Skin: Positive for wound           Objective:      Temp (!) 97 1 °F (36 2 °C) (Temporal)          Physical Exam  Skin:

## 2023-03-13 NOTE — PATIENT INSTRUCTIONS
The wound is healing, and free of infection  Please continue with twice a day dressing changes, you can use 2 x 2 gauze at this point  Return in 2 weeks, or sooner if there are any problems

## 2023-03-16 ENCOUNTER — HOME CARE VISIT (OUTPATIENT)
Dept: HOME HEALTH SERVICES | Facility: HOME HEALTHCARE | Age: 38
End: 2023-03-16

## 2023-03-30 ENCOUNTER — HOME CARE VISIT (OUTPATIENT)
Dept: HOME HEALTH SERVICES | Facility: HOME HEALTHCARE | Age: 38
End: 2023-03-30

## 2023-04-03 ENCOUNTER — OFFICE VISIT (OUTPATIENT)
Dept: SURGERY | Facility: CLINIC | Age: 38
End: 2023-04-03

## 2023-04-03 VITALS — TEMPERATURE: 97.3 F

## 2023-04-03 DIAGNOSIS — L02.212 ABSCESS OF BACK: Primary | ICD-10-CM

## 2023-04-03 RX ORDER — METFORMIN HYDROCHLORIDE 500 MG/1
TABLET, EXTENDED RELEASE ORAL
COMMUNITY
Start: 2023-03-26

## 2023-04-03 NOTE — PATIENT INSTRUCTIONS
You don't need a dressing on the back  The wound has healed, please us an emollient on the scar and keep it open to air

## 2023-04-04 NOTE — PROGRESS NOTES
Assessment/Plan:    No problem-specific Assessment & Plan notes found for this encounter  There are no diagnoses linked to this encounter  Subjective:      Patient ID: Thalia Toscano is a 43 y o  female  The patient is a 42-year-old white female being treated for a wound of her mid back resulting from I&D of a staph abscess  The wound has gone on to heal, there is no fluctuance to suggest any undrained infection or recurrent wound  The patient was advised to use an emollient on the scar, to follow closely for any signs of any further skin abscesses and to seek care for any symptoms of a recurrent skin abscess  She will follow-up with general surgery on an as-needed basis  The following portions of the patient's history were reviewed and updated as appropriate: allergies, current medications, past family history, past medical history, past social history, past surgical history and problem list     Review of Systems   Constitutional: Negative for chills and fever  Skin: Negative for wound           Objective:      Temp (!) 97 3 °F (36 3 °C) (Tympanic)          Physical Exam

## 2023-05-05 ENCOUNTER — TELEPHONE (OUTPATIENT)
Dept: NEPHROLOGY | Facility: CLINIC | Age: 38
End: 2023-05-05

## 2023-08-15 DIAGNOSIS — Z79.4 TYPE 2 DIABETES MELLITUS WITH DIABETIC NEUROPATHY, WITH LONG-TERM CURRENT USE OF INSULIN (HCC): ICD-10-CM

## 2023-08-15 DIAGNOSIS — E11.40 TYPE 2 DIABETES MELLITUS WITH DIABETIC NEUROPATHY, WITH LONG-TERM CURRENT USE OF INSULIN (HCC): ICD-10-CM

## 2023-08-15 RX ORDER — INSULIN GLARGINE 100 [IU]/ML
INJECTION, SOLUTION SUBCUTANEOUS
Qty: 15 ML | Refills: 0 | OUTPATIENT
Start: 2023-08-15

## 2023-09-11 ENCOUNTER — OFFICE VISIT (OUTPATIENT)
Dept: WOUND CARE | Facility: CLINIC | Age: 38
End: 2023-09-11
Payer: MEDICARE

## 2023-09-11 ENCOUNTER — HOME HEALTH ADMISSION (OUTPATIENT)
Dept: HOME HEALTH SERVICES | Facility: HOME HEALTHCARE | Age: 38
End: 2023-09-11
Payer: MEDICARE

## 2023-09-11 VITALS
RESPIRATION RATE: 20 BRPM | SYSTOLIC BLOOD PRESSURE: 151 MMHG | TEMPERATURE: 96.9 F | DIASTOLIC BLOOD PRESSURE: 88 MMHG | BODY MASS INDEX: 41.95 KG/M2 | HEIGHT: 70 IN | HEART RATE: 75 BPM | WEIGHT: 293 LBS

## 2023-09-11 DIAGNOSIS — L97.812 VENOUS STASIS ULCER OF OTHER PART OF RIGHT LOWER LEG WITH FAT LAYER EXPOSED, UNSPECIFIED WHETHER VARICOSE VEINS PRESENT (HCC): ICD-10-CM

## 2023-09-11 DIAGNOSIS — L03.115 CELLULITIS OF RIGHT LEG: ICD-10-CM

## 2023-09-11 DIAGNOSIS — I83.018 VENOUS STASIS ULCER OF OTHER PART OF RIGHT LOWER LEG WITH FAT LAYER EXPOSED, UNSPECIFIED WHETHER VARICOSE VEINS PRESENT (HCC): ICD-10-CM

## 2023-09-11 DIAGNOSIS — L89.891 PRESSURE ULCER OF LEFT FOOT, STAGE 1: ICD-10-CM

## 2023-09-11 DIAGNOSIS — I87.2 EDEMA OF BOTH LOWER EXTREMITIES DUE TO PERIPHERAL VENOUS INSUFFICIENCY: Primary | ICD-10-CM

## 2023-09-11 DIAGNOSIS — I83.028 VENOUS STASIS ULCER OF OTHER PART OF LEFT LOWER LEG WITH FAT LAYER EXPOSED, UNSPECIFIED WHETHER VARICOSE VEINS PRESENT (HCC): ICD-10-CM

## 2023-09-11 DIAGNOSIS — L97.822 VENOUS STASIS ULCER OF OTHER PART OF LEFT LOWER LEG WITH FAT LAYER EXPOSED, UNSPECIFIED WHETHER VARICOSE VEINS PRESENT (HCC): ICD-10-CM

## 2023-09-11 DIAGNOSIS — R47.89 RAPID RATE OF SPEECH: ICD-10-CM

## 2023-09-11 DIAGNOSIS — F32.9 REACTIVE DEPRESSION: ICD-10-CM

## 2023-09-11 PROCEDURE — 97597 DBRDMT OPN WND 1ST 20 CM/<: CPT | Performed by: STUDENT IN AN ORGANIZED HEALTH CARE EDUCATION/TRAINING PROGRAM

## 2023-09-11 PROCEDURE — 99204 OFFICE O/P NEW MOD 45 MIN: CPT | Performed by: STUDENT IN AN ORGANIZED HEALTH CARE EDUCATION/TRAINING PROGRAM

## 2023-09-11 PROCEDURE — 97598 DBRDMT OPN WND ADDL 20CM/<: CPT | Performed by: STUDENT IN AN ORGANIZED HEALTH CARE EDUCATION/TRAINING PROGRAM

## 2023-09-11 PROCEDURE — 11042 DBRDMT SUBQ TIS 1ST 20SQCM/<: CPT | Performed by: STUDENT IN AN ORGANIZED HEALTH CARE EDUCATION/TRAINING PROGRAM

## 2023-09-11 PROCEDURE — 99214 OFFICE O/P EST MOD 30 MIN: CPT | Performed by: STUDENT IN AN ORGANIZED HEALTH CARE EDUCATION/TRAINING PROGRAM

## 2023-09-11 PROCEDURE — 87205 SMEAR GRAM STAIN: CPT | Performed by: STUDENT IN AN ORGANIZED HEALTH CARE EDUCATION/TRAINING PROGRAM

## 2023-09-11 PROCEDURE — 87070 CULTURE OTHR SPECIMN AEROBIC: CPT | Performed by: STUDENT IN AN ORGANIZED HEALTH CARE EDUCATION/TRAINING PROGRAM

## 2023-09-11 RX ORDER — AMLODIPINE BESYLATE 2.5 MG/1
2.5 TABLET ORAL DAILY
COMMUNITY

## 2023-09-11 RX ORDER — LIDOCAINE 40 MG/G
CREAM TOPICAL ONCE
Status: COMPLETED | OUTPATIENT
Start: 2023-09-11 | End: 2023-09-11

## 2023-09-11 RX ORDER — DOXYCYCLINE HYCLATE 100 MG/1
100 CAPSULE ORAL EVERY 12 HOURS SCHEDULED
Qty: 14 CAPSULE | Refills: 0 | Status: SHIPPED | OUTPATIENT
Start: 2023-09-11 | End: 2023-09-18

## 2023-09-11 RX ORDER — AMLODIPINE BESYLATE 5 MG/1
5 TABLET ORAL DAILY
COMMUNITY

## 2023-09-11 RX ADMIN — LIDOCAINE 1 APPLICATION: 40 CREAM TOPICAL at 15:36

## 2023-09-11 NOTE — PROGRESS NOTES
Patient ID: Cameron Varela is a 43 y.o. female Date of Birth 1985       Chief Complaint   Patient presents with   • New Patient Visit     Patient reports open areas to the RLE have been open and draining for several months. Patient reports the open areas to the LLE open and close up over time, reports they are from wearing the Walking Boot. Allergies:  Bee venom and Wasp venom    Diagnosis:   Diagnosis ICD-10-CM Associated Orders   1. Edema of both lower extremities due to peripheral venous insufficiency  I87.2 Ambulatory Referral to Wound Care      2. Venous stasis ulcer of other part of right lower leg with fat layer exposed, unspecified whether varicose veins present ([facility], located within [facility])  I83.018 lidocaine (LMX) 4 % cream    L97.812 Referral to [facility]     Wound home care     Wound miscellaneous orders     Wound cleansing and dressings     Wound compression and edema control     Wound culture and Gram stain     doxycycline hyclate (VIBRAMYCIN) 100 mg capsule     Wound culture and Gram stain      3. Cellulitis of right leg  L03.115 Wound culture and Gram stain     doxycycline hyclate (VIBRAMYCIN) 100 mg capsule     Wound culture and Gram stain      4. Venous stasis ulcer of other part of left lower leg with fat layer exposed, unspecified whether varicose veins present ([facility])  I83.028 lidocaine (LMX) 4 % cream    L97.822 Referral to [facility]     Wound home care     Wound miscellaneous orders     Wound cleansing and dressings     Wound compression and edema control      5. Pressure ulcer of left foot, stage 1  L89.891 Referral to [address] [place] [address] home care     Wound miscellaneous orders     Wound cleansing and dressings     Wound compression and edema control      6. Reactive depression  F32.9 Ambulatory Referral to Psychiatry      7.  Rapid rate of speech  R47.89 Ambulatory Referral to Psychiatry           Assessment  & Plan:    • Initial evaluation of RLE venous ulceration. Majority of wound bed is partial thickness with few scattered areas that are deeper with slough. Moderate-heavy serous drainage. There is surrounding erythema consistent with cellulitis. o Selective debridement, as below. o Culture taken of wound. Initiated on Doxycyline. Pt denies any chance that she could be pregnant currently. Will change abx prn based on culture results. o Silver alginate to wound bed. Coflex lite for compression. R ALBERT of 1.42 and great toe pressure of 66 mmHg as of 02/17/23. 2+ DP and PT pulses. o Elevate legs when resting and avoid prolonged standing in one place. May walk for short periods of time. Recommend sleeping in bed as soon as possible.   o Has updated bloodwork ordered by PCP. Will discuss possibility of adding diuretic back into regimen post MACARIO with PCP following result of CMP.  o Would benefit from reduction of tobacco use.  o  A1C results reviewed with the patient today. Uncontrolled at 13.2 as of 08/29/23. PCP has referred pt to endocrinology for further evaluation. o Instructed to monitor for any changes including redness or swelling surrounding the wound, increased drainage or pain as well as fevers or chills. •  Initial evaluation of VSU of LLE. Few scattered areas of skin breakdown with slough and serous drainage on medial calf. Minimal surrounding erythema is present.   o Surgical debridement, as below. o Silver alginate to wound bed. Spandagrip for compression given peripheral neuropathy and great toe with mild dusky discoloration. 2+ DP and PT pulses present on examination. • Stage 1 pressure injury of dorsal aspect of L foot related to rubbing of leg brace. No areas of drainage or open wound present on L foot.   o Allevyn foam to the area for protection against friction. Brace does not come in larger size. May benefit from podiatry evaluation regarding brace to ensure proper fit.   • Referral to podiatry placed as pt notes depression following the passing of her dog. • F/u in one week. Instructed to call if any questions or concerns arise in meantime. Subjective:   09/11/23: 44 y/o F with PMHx of hypothyroidism, uncontrolled DM with peripheral neuropathy, CKD, HTN, obesity, arterial insufficiency of lower extremity, hx of multiple abscesses, presents for evaluation of wounds of bilateral lower extremities and L foot. Was previously seen for wound of RLE and cellulitis at urgent care on 08/01/23 and was prescribed Clindamycin. She states following taking the antibiotic the wound and surrounding redness appeared to be improving but after the seven day course was finished she began to have surrounding redness and worsening size of the wound again. Currently band-aids are being used to the LLE and non-stick bandages to the RLE. Currently she sleeps on a couch and tries to elevate her legs as best as she can; is not currently sleeping in bed because there is not air conditioning in the room with her bed and she gets too warm. Has not been wearing compression as the compression socks are too difficult to get on and off. Sugars are uncontrolled but has recently established with new family doctor whom referred pt to endocrinology; the soonest appointment is in January for her desired office closest to home. Checks her sugars three times daily and is usually above 300s. Is not currently on a diuretic, was discontinued after she was hospitalized for acute kidney injury. Smokes about 1 ppd. Pt is currently wearing brace for stability given she has drop foot of her L foot related to herniated discs (is not able to get cleared for surgery yet given her uncontrolled BP and sugars). The boot has been rubbing on her dorsal foot which has created a wound in the past but this area is not currently draining. Denies fevers, chills.          The following portions of the patient's history were reviewed and updated as appropriate:   Patient Active Problem List   Diagnosis   • Arterial insufficiency of lower extremity (HCC)   • Class 3 severe obesity due to excess calories with serious comorbidity and body mass index (BMI) of 50.0 to 59.9 in adult [facility])   • Marijuana use   • Abscess of back   • Primary hypertension   • Hypothyroidism   • Type 2 diabetes mellitus with diabetic neuropathy, with long-term current use of insulin (HCC)   • Pure hypercholesterolemia   • Stage 3a chronic kidney disease ([address])   • Acute kidney injury ([address])   • Hypomagnesemia   • Hyponatremia   • Lactic acidosis     Past Medical History:   Diagnosis Date   • Abscess of left thigh    • Abscess, scalp    • Diabetes mellitus ([address])    • Diabetes mellitus, type 2 (HCC)    • Hypertension    • Hypothyroidism    • Renal disorder    • Severe obesity (BMI >= 40) ([facility])      Past Surgical History:   Procedure Laterality Date   • ABCESS DRAINAGE  12/2022    scalp and thigh   • HIP SURGERY Left     fracture- june 2008   • SHOULDER SURGERY       Family History   Problem Relation Age of Onset   • Cancer Mother    • Diabetes Mother    • Cancer Father    • Other Father         aortic insufficiency     Social History     Socioeconomic History   • Marital status: Single     Spouse name: None   • Number of children: None   • Years of education: None   • Highest education level: Some college, no degree   Occupational History   • None   Tobacco Use   • Smoking status: Every Day     Packs/day: 1.00     Years: 20.00     Total pack years: 20.00     Types: Cigarettes   • Smokeless tobacco: Never   Vaping Use   • Vaping Use: Never used   Substance and Sexual Activity   • Alcohol use: Never   • Drug use: Yes     Types: Marijuana   • Sexual activity: Not Currently   Other Topics Concern   • None   Social History Narrative    Live house- with parents        Reports has disability due to drop foot- via [facility]     Social Determinants of Health     Financial Resource Strain: Not on file   Food Insecurity: No Food Insecurity (2/18/2023)    Hunger Vital Sign    • Worried About Running Out of Food in the Last Year: Never true    • Ran Out of Food in the Last Year: Never true   Transportation Needs: No Transportation Needs (2/18/2023)    PRAPARE - Transportation    • Lack of Transportation (Medical): No    • Lack of Transportation (Non-Medical): No   Physical Activity: Not on file   Stress: Not on file   Social Connections: Not on file   Intimate Partner Violence: Not on file   Housing Stability: Low Risk  (2/18/2023)    Housing Stability Vital Sign    • Unable to Pay for Housing in the Last Year: No    • Number of Places Lived in the Last Year: 1    • Unstable Housing in the Last Year: No       Current Outpatient Medications:   •  amLODIPine (NORVASC) 2.5 mg tablet, Take 2.5 mg by mouth daily Reports taking a 2.5mg tab with a 5mg tab to = 7.5 daily, Disp: , Rfl:   •  amLODIPine (NORVASC) 5 mg tablet, Take 5 mg by mouth daily Reports taking a 2.5mg tab with a 5mg tab to = 7.5 daily, Disp: , Rfl:   •  doxycycline hyclate (VIBRAMYCIN) 100 mg capsule, Take 1 capsule (100 mg total) by mouth every 12 (twelve) hours for 7 days, Disp: 14 capsule, Rfl: 0  •  gabapentin (NEURONTIN) 600 MG tablet, 600 mg 2 (two) times a day, Disp: , Rfl: 5  •  insulin aspart (NovoLOG FlexPen) 100 UNIT/ML injection pen, TAKE EXTRA INSULIN BASED ON BG RESULTS PER SCALE PROVIDED (ISF 10-80 MG/DL OPTIONS).  E11.65 (TYPE 2 DIABETES) -200 = 1 UNIT HUMALOG -250 =<MORE>, Disp: , Rfl:   •  metFORMIN (GLUCOPHAGE-XR) 500 mg 24 hr tablet, , Disp: , Rfl:   •  Metoprolol Tartrate 75 MG TABS, 75 mg every 12 (twelve) hours, Disp: , Rfl: 3  •  amLODIPine (NORVASC) 10 mg tablet, , Disp: , Rfl:   •  Comfort EZ Pen Needles 32G X 4 MM MISC, , Disp: , Rfl:   •  glucose blood test strip, e11.65 (Type 2 Diabetes) Test daily before meals 3 times a day, Disp: , Rfl:   •  GNP Alcohol Swabs 70 % PADS, , Disp: , Rfl:   •  Lancets 30G MISC, , Disp: , Rfl:   • levothyroxine (Synthroid) 25 mcg/mL SUSP, , Disp: , Rfl:   No current facility-administered medications for this visit. Review of Systems   Constitutional: Negative for chills and fever. Cardiovascular: Positive for leg swelling. Musculoskeletal: Positive for back pain. Skin: Positive for color change (redness surrounding RLE wound) and wound (bilateral lower legs). Neurological: Positive for weakness (drop foot of L foot) and numbness (peripheral neuropathy of LE). Psychiatric/Behavioral:        +depressed mood         Objective:  /88   Pulse 75   Temp (!) 96.9 °F (36.1 °C)   Resp 20   Ht 5' 10" (1.778 m)   Wt (!) 185 kg (408 lb)   BMI 58.54 kg/m²   Pain Score: 0-No pain     Physical Exam  Vitals reviewed. Constitutional:       General: She is not in acute distress. Appearance: She is morbidly obese. She is not ill-appearing. Cardiovascular:      Rate and Rhythm: Normal rate. Pulses:           Dorsalis pedis pulses are 2+ on the right side and 2+ on the left side. Posterior tibial pulses are 2+ on the right side and 2+ on the left side. Pulmonary:      Effort: Pulmonary effort is normal. No respiratory distress. Musculoskeletal:      Right lower leg: Edema present. Left lower leg: Edema present. Skin:     Findings: Erythema and wound present. Comments: RLE venous ulceration. Majority of wound bed is partial thickness with few scattered areas that are deeper with slough. Moderate-heavy serous drainage. There is surrounding erythema consistent with cellulitis. VSU of LLE. Few scattered areas of skin breakdown with slough and serous drainage on medial calf. Minimal surrounding erythema is present. Stage 1 pressure injury of dorsal aspect of L foot related to rubbing of leg brace. No areas of drainage or open wound present on L foot. Neurological:      Mental Status: She is alert. Gait: Gait abnormal (uses brace on L foot d/t foot drop). Psychiatric:         Mood and Affect: Affect is tearful. Speech: Speech is rapid and pressured. Wound 09/11/23 Venous Ulcer Leg Right; Lower (Active)   Wound Image   09/11/23 1417   Wound Description Yellow;Epithelialization;Granulation tissue;Pink;Slough 09/11/23 1429   Essie-wound Assessment Edema; Erythema; Maceration 09/11/23 1429   Wound Length (cm) 18 cm 09/11/23 1429   Wound Width (cm) 22 cm 09/11/23 1429   Wound Depth (cm) 0.1 cm 09/11/23 1429   Wound Surface Area (cm^2) 396 cm^2 09/11/23 1429   Wound Volume (cm^3) 39.6 cm^3 09/11/23 1429   Calculated Wound Volume (cm^3) 39.6 cm^3 09/11/23 1429   Drainage Amount Large 09/11/23 1429   Drainage Description Serous; Yellow 09/11/23 1429   Non-staged Wound Description Full thickness 09/11/23 1429       Wound 09/11/23 Venous Ulcer Leg Left; Lower (Active)   Wound Image       09/11/23 1521   Wound Description Epithelialization;Yellow;Slough;Pink 09/11/23 1429   Essie-wound Assessment Edema; Erythema 09/11/23 1429   Wound Length (cm) 3.6 cm 09/11/23 1429   Wound Width (cm) 4.2 cm 09/11/23 1429   Wound Depth (cm) 0.1 cm 09/11/23 1429   Wound Surface Area (cm^2) 15.12 cm^2 09/11/23 1429   Wound Volume (cm^3) 1.512 cm^3 09/11/23 1429   Calculated Wound Volume (cm^3) 1.51 cm^3 09/11/23 1429   Drainage Amount Moderate 09/11/23 1429   Drainage Description Serous; Yellow 09/11/23 1429   Non-staged Wound Description Full thickness 09/11/23 1429   Dressing Status Intact 09/11/23 1429       Wound 09/11/23 Pressure Injury Foot Left;Dorsal (Active)   Wound Image   09/11/23 1418   Wound Description Pink 09/11/23 1430   Pressure Injury Stage 1 09/11/23 1430   Essie-wound Assessment Edema;Scaly;Dry;Erythema 09/11/23 1430   Wound Length (cm) 0.2 cm 09/11/23 1430   Wound Width (cm) 0.2 cm 09/11/23 1430   Wound Depth (cm) 0.1 cm 09/11/23 1430   Wound Surface Area (cm^2) 0.04 cm^2 09/11/23 1430   Wound Volume (cm^3) 0.004 cm^3 09/11/23 1430   Calculated Wound Volume (cm^3) 0 cm^3 09/11/23 1430   Drainage Amount Small 09/11/23 1430   Drainage Description Serous; Yellow 09/11/23 1430   Non-staged Wound Description Full thickness 09/11/23 1430             Debridement   Wound 09/11/23 Venous Ulcer Leg Left; Lower    Universal Protocol:  Consent: Verbal consent obtained. Consent given by: patient  Time out: Immediately prior to procedure a "time out" was called to verify the correct patient, procedure, equipment, support staff and site/side marked as required. Patient understanding: patient states understanding of the procedure being performed  Patient identity confirmed: verbally with patient      Performed by: PA  Debridement type: surgical  Level of debridement: subcutaneous tissue  Pain control: lidocaine 4%  Post-debridement measurements  Length (cm): 3.6  Width (cm): 4.2  Depth (cm): 0.3  Percent debrided: 50%  Surface Area (cm^2): 15.12  Area debrided (cm^2): 7.56  Volume (cm^3): 4.54  Tissue and other material debrided: dermis and subcutaneous tissue  Devitalized tissue debrided: slough  Instrument(s) utilized: curette  Bleeding: small  Hemostasis obtained with: pressure  Procedural pain (0-10): 0  Post-procedural pain: 0   Response to treatment: procedure was tolerated well                         Wound Instructions:  Orders Placed This Encounter   Procedures   • Wound home care     Referral to [facility] for assist with Wound Care    PT evaluation     Standing Status:   Future     Standing Expiration Date:   9/11/2024   • Wound miscellaneous orders     Wound Culture completed today to the E     Standing Status:   Future     Standing Expiration Date:   9/11/2024   • Wound cleansing and dressings     Wash your hands with soap and water. Remove old dressing, discard into plastic bag and place in trash. Cleanse the wound with NSS or wound cleanser prior to applying a clean dressing.    May wash legs after removing the wraps with mild soap and water, rinse well and pat dry    Right LE ulcer  Apply Silver Alginate to the open draining areas. Cover with gauze/ABD pads  Secure with Coflex Lite  Change dressing 2 x weekly and as needed for excessive drainage    Left LE Ulcer:  Apply Silver Alginate to the open draining areas. Cover with gauze/ABD pads  Secure with mary anne and tape  Change dressing 2 x weekly and as needed for excessive drainage  Followed by Spandagrip Size G    Left Foot ulcer:  Apply Silicone bordered foam dressing. Change dressing 2 x weekly and as needed for excessive drainage          Treatments completed as above at [facility] today        May walk short distance throughout the day      Recommend cut back on smoking each day to assist with the healing     Standing Status:   Future     Standing Expiration Date:   9/11/2024   • Wound compression and edema control     Multi-layer compression wrap Instructions---Coflex Lite to RLE    Keep compression wrap/wraps clean and dry. If wraps are too tight and you experience numbness/tingling, call the wound center. If after hours, remove wraps or proceed to nearest E.R. and call wound center in AM.    Shala Rumps will be changed 2x weekly    Avoid prolonged standing in one place. Elevate leg(s) above the level of the heart when sitting or as much as possible. Elastic Tubular Stocking---Spandagrip Size G to the LLE    Tubular elastic bandage: Apply from base of toes to behind the knee. Apply in AM, may remove for sleep. Avoid prolonged standing in one place. Elevate leg(s) above the level of the heart when sitting or as much as possible.      Standing Status:   Future     Standing Expiration Date:   9/11/2024   • Debridement     This order was created via procedure documentation   • Wound culture and Gram stain     Standing Status:   Future     Number of Occurrences:   1     Standing Expiration Date:   9/11/2024     Order Specific Question:   Release to patient through [employer]     Answer:   Immediate   • Referral to [id number] [facility]     Standing Status:   Future     Standing Expiration Date:   9/11/2024     Referral Priority:   Routine     Referral Type:   Home Health     Referral Reason:   Specialty Services Required     Requested Specialty:   Jb Duron     Number of Visits Requested:   1     Expiration Date:   9/11/2024   • Ambulatory Referral to Psychiatry     Standing Status:   Future     Standing Expiration Date:   9/11/2024     Referral Priority:   Routine     Referral Type:   Consult - AMB     Referral Reason:   Specialty Services Required     Requested Specialty:   Psychiatry     Number of Visits Requested:   1     Expiration Date:   9/11/2024       Kennedi Watts PA-C      Total time spent today:  45 minutes. This includes reviewing the patient's chart, pertinent physician records including urgent care visit on 08/01/23, family medicine visit on 08/29/23, a1c from 08/29/23, hospital encounter note from 02/17/23. Portions of the record may have been created with voice recognition software. Occasional wrong word or "sound alike" substitutions may have occurred due to the inherent limitations of voice recognition software. Read the chart carefully and recognize, using context, where substitutions have occurred.

## 2023-09-11 NOTE — PATIENT INSTRUCTIONS
Orders Placed This Encounter   Procedures    Wound home care     Referral to [facility] for assist with Wound Care    PT evaluation     Standing Status:   Future     Standing Expiration Date:   9/11/2024    Wound miscellaneous orders     Wound Culture completed today to the E     Standing Status:   Future     Standing Expiration Date:   9/11/2024    Wound cleansing and dressings     Wash your hands with soap and water. Remove old dressing, discard into plastic bag and place in trash. Cleanse the wound with NSS or wound cleanser prior to applying a clean dressing. May wash legs after removing the wraps with mild soap and water, rinse well and pat dry    Right LE ulcer  Apply Silver Alginate to the open draining areas. Cover with gauze/ABD pads  Secure with Coflex Lite  Change dressing 2 x weekly and as needed for excessive drainage    Left LE Ulcer:  Apply Silver Alginate to the open draining areas. Cover with gauze/ABD pads  Secure with mary anne and tape  Change dressing 2 x weekly and as needed for excessive drainage  Followed by Spandagrip Size G    Left Foot ulcer:  Apply Silicone bordered foam dressing. Change dressing 2 x weekly and as needed for excessive drainage          Treatments completed as above at the [facility] today        May walk short distance throughout the day      Recommend cut back on smoking each day to assist with the healing     Standing Status:   Future     Standing Expiration Date:   9/11/2024    Wound compression and edema control     Multi-layer compression wrap Instructions---Coflex Lite to E    Keep compression wrap/wraps clean and dry. If wraps are too tight and you experience numbness/tingling, call the wound center. If after hours, remove wraps or proceed to nearest E.R. and call wound center in AM.    Nancy Tray will be changed 2x weekly    Avoid prolonged standing in one place. Elevate leg(s) above the level of the heart when sitting or as much as possible.        Elastic Tubular Stocking---Spandagrip Size G to the LLE    Tubular elastic bandage: Apply from base of toes to behind the knee. Apply in AM, may remove for sleep. Avoid prolonged standing in one place. Elevate leg(s) above the level of the heart when sitting or as much as possible.      Standing Status:   Future     Standing Expiration Date:   9/11/2024    Referral to [address] [place] [address] [facility]     Standing Status:   Future     Standing Expiration Date:   9/11/2024     Referral Priority:   Routine     Referral Type:   Home Health     Referral Reason:   Specialty Services Required     Requested Specialty:   Jb Duron     Number of Visits Requested:   1     Expiration Date:   9/11/2024

## 2023-09-12 NOTE — PROGRESS NOTES
Debridement   Wound 09/11/23 Venous Ulcer Leg Right; Lower    Universal Protocol:  Consent: Verbal consent obtained. Consent given by: patient  Time out: Immediately prior to procedure a "time out" was called to verify the correct patient, procedure, equipment, support staff and site/side marked as required.   Patient understanding: patient states understanding of the procedure being performed  Patient identity confirmed: verbally with patient      Performed by: PA  Debridement type: selective  Pain control: lidocaine 4%  Post-debridement measurements  Length (cm): 18  Width (cm): 22  Depth (cm): 0.1  Percent debrided: 25%  Surface Area (cm^2): 396  Area debrided (cm^2): 99  Volume (cm^3): 39.6  Devitalized tissue debrided: fibrin and slough  Instrument(s) utilized: curette  Bleeding: small  Hemostasis obtained with: pressure  Procedural pain (0-10): 0  Post-procedural pain: 0   Response to treatment: procedure was tolerated well  Debridement Comments: Slough removed without creating significant depth

## 2023-09-14 ENCOUNTER — HOME CARE VISIT (OUTPATIENT)
Dept: HOME HEALTH SERVICES | Facility: HOME HEALTHCARE | Age: 38
End: 2023-09-14
Payer: MEDICARE

## 2023-09-14 VITALS
HEART RATE: 69 BPM | DIASTOLIC BLOOD PRESSURE: 94 MMHG | OXYGEN SATURATION: 98 % | TEMPERATURE: 97.6 F | SYSTOLIC BLOOD PRESSURE: 160 MMHG | RESPIRATION RATE: 18 BRPM

## 2023-09-14 PROCEDURE — 10330081 VN NO-PAY CLAIM PROCEDURE

## 2023-09-14 PROCEDURE — 400013 VN SOC

## 2023-09-14 PROCEDURE — G0299 HHS/HOSPICE OF RN EA 15 MIN: HCPCS

## 2023-09-15 ENCOUNTER — TELEPHONE (OUTPATIENT)
Dept: WOUND CARE | Facility: CLINIC | Age: 38
End: 2023-09-15

## 2023-09-15 LAB
BACTERIA WND AEROBE CULT: NO GROWTH
GRAM STN SPEC: NORMAL

## 2023-09-15 PROCEDURE — NC001 PR NO CHARGE: Performed by: STUDENT IN AN ORGANIZED HEALTH CARE EDUCATION/TRAINING PROGRAM

## 2023-09-15 NOTE — TELEPHONE ENCOUNTER
Called patient and left voicemail regarding results of final culture. There is no bacterial growth demonstrated therefore she may discontinue use of currently prescribed antibiotic.

## 2023-09-18 ENCOUNTER — HOME CARE VISIT (OUTPATIENT)
Dept: HOME HEALTH SERVICES | Facility: HOME HEALTHCARE | Age: 38
End: 2023-09-18
Payer: MEDICARE

## 2023-09-18 ENCOUNTER — OFFICE VISIT (OUTPATIENT)
Dept: WOUND CARE | Facility: CLINIC | Age: 38
End: 2023-09-18
Payer: MEDICARE

## 2023-09-18 VITALS
HEART RATE: 87 BPM | RESPIRATION RATE: 16 BRPM | SYSTOLIC BLOOD PRESSURE: 195 MMHG | TEMPERATURE: 97 F | DIASTOLIC BLOOD PRESSURE: 105 MMHG

## 2023-09-18 DIAGNOSIS — L97.812 VENOUS STASIS ULCER OF OTHER PART OF RIGHT LOWER LEG WITH FAT LAYER EXPOSED, UNSPECIFIED WHETHER VARICOSE VEINS PRESENT (HCC): Primary | ICD-10-CM

## 2023-09-18 DIAGNOSIS — I10 UNCONTROLLED HYPERTENSION: ICD-10-CM

## 2023-09-18 DIAGNOSIS — I87.2 EDEMA OF BOTH LOWER EXTREMITIES DUE TO PERIPHERAL VENOUS INSUFFICIENCY: ICD-10-CM

## 2023-09-18 DIAGNOSIS — I83.028 VENOUS STASIS ULCER OF OTHER PART OF LEFT LOWER LEG WITH FAT LAYER EXPOSED, UNSPECIFIED WHETHER VARICOSE VEINS PRESENT (HCC): ICD-10-CM

## 2023-09-18 DIAGNOSIS — L89.892 PRESSURE INJURY OF DORSUM OF LEFT FOOT, STAGE 2 (HCC): ICD-10-CM

## 2023-09-18 DIAGNOSIS — E66.01 CLASS 3 SEVERE OBESITY DUE TO EXCESS CALORIES WITH SERIOUS COMORBIDITY AND BODY MASS INDEX (BMI) OF 50.0 TO 59.9 IN ADULT (HCC): Chronic | ICD-10-CM

## 2023-09-18 DIAGNOSIS — L97.822 VENOUS STASIS ULCER OF OTHER PART OF LEFT LOWER LEG WITH FAT LAYER EXPOSED, UNSPECIFIED WHETHER VARICOSE VEINS PRESENT (HCC): ICD-10-CM

## 2023-09-18 DIAGNOSIS — I83.018 VENOUS STASIS ULCER OF OTHER PART OF RIGHT LOWER LEG WITH FAT LAYER EXPOSED, UNSPECIFIED WHETHER VARICOSE VEINS PRESENT (HCC): Primary | ICD-10-CM

## 2023-09-18 PROCEDURE — 97598 DBRDMT OPN WND ADDL 20CM/<: CPT | Performed by: FAMILY MEDICINE

## 2023-09-18 PROCEDURE — 97597 DBRDMT OPN WND 1ST 20 CM/<: CPT | Performed by: FAMILY MEDICINE

## 2023-09-18 RX ORDER — LIDOCAINE 40 MG/G
CREAM TOPICAL ONCE
Status: COMPLETED | OUTPATIENT
Start: 2023-09-18 | End: 2023-09-18

## 2023-09-18 RX ADMIN — LIDOCAINE: 40 CREAM TOPICAL at 11:09

## 2023-09-18 NOTE — PATIENT INSTRUCTIONS
Orders Placed This Encounter   Procedures    Wound cleansing and dressings     Wound cleansing and dressings      Wash your hands with soap and water. Remove old dressing, discard into plastic bag and place in trash. Cleanse the wound with NSS or wound cleanser prior to applying a clean dressing. May wash legs after removing the wraps with mild soap and water, rinse well and pat dry       Right LE ulcer  Apply Silver Alginate to the open draining areas. Cover with gauze/ABD pads  Secure with Coflex Lite  Change dressing 2 x weekly and as needed for excessive drainage     Left LE Ulcer:  Bacitracin to area just for today at the wound center. Apply Silicone bordered foam dressing. Change dressing 2 x weekly and as needed for excessive drainage  Followed by Spandagrip Size G     Left Foot ulcer:  Bacitracin to area just for today at the wound center. Apply Silicone bordered foam dressing. Change dressing 2 x weekly and as needed for excessive drainage            Treatments completed as above at the [facility] today            May walk short distance throughout the day. Recommend cut back on smoking each day to assist with the healing         Wound compression and edema control      Multi-layer compression wrap Instructions---Coflex Lite to RLE     Keep compression wrap/wraps clean and dry. If wraps are too tight and you experience numbness/tingling, call the wound center. If after hours, remove wraps or proceed to nearest E.R. and call wound center in AM.   Windy Jaylin will be changed 2x weekly  Avoid prolonged standing in one place. Elevate leg(s) above the level of the heart when sitting or as much as possible. Elastic Tubular Stocking---Spandagrip Size G to the LLE   Tubular elastic bandage: Apply from base of toes to behind the knee. Apply in AM, may remove for sleep. Avoid prolonged standing in one place. Elevate leg(s) above the level of the heart when sitting or as much as possible.      Standing Status:   Future     Standing Expiration Date:   9/18/2024

## 2023-09-18 NOTE — PROGRESS NOTES
Patient ID: Karlie Carpio is a 43 y.o. female Date of Birth 1985       Chief Complaint   Patient presents with   • Follow Up Wound Care Visit     BLE       Allergies:  Bee venom and Wasp venom    Diagnosis:      Diagnosis ICD-10-CM Associated Orders   1. Venous stasis ulcer of other part of right lower leg with fat layer exposed, unspecified whether varicose veins present ([facility])  I83.018 Wound cleansing and dressings    L97.812 lidocaine (LMX) 4 % cream     Debridement      2. Edema of both lower extremities due to peripheral venous insufficiency  I87.2 Wound cleansing and dressings     lidocaine (LMX) 4 % cream      3. Venous stasis ulcer of other part of left lower leg with fat layer exposed, unspecified whether varicose veins present ([facility])  I83.028 Wound cleansing and dressings    L97.822 lidocaine (LMX) 4 % cream      4. Pressure injury of dorsum of left foot, stage 2 ([facility])  L89.892 Wound cleansing and dressings     lidocaine (LMX) 4 % cream      5. Class 3 severe obesity due to excess calories with serious comorbidity and body mass index (BMI) of 50.0 to 59.9 in adult ([facility])  E66.01     Z68.43       6. Uncontrolled hypertension  I10               Assessment & Plan:  Improving venous stasis ulcer of the right lower extremity status post cellulitis. Selective debridement. Continue with silver alginate and ABD. Coflex lite to the right lower extremity. Stage II pressure injury of the dorsum of the left foot. Also pressure injury of the left calf. This is due to ill fitting cam walker. She will be going to podiatry to try to obtain a new cam.  Severe, uncontrolled hypertension. Asymptomatic. Patient states that her blood pressures are never adequately controlled and therefore cannot have surgery to fuse her left ankle. Follow-up with PCP. Morbid obesity.          Subjective:   09/11/23: 44 y/o F with PMHx of hypothyroidism, uncontrolled DM with peripheral neuropathy, CKD, HTN, obesity, arterial insufficiency of lower extremity, hx of multiple abscesses, presents for evaluation of wounds of bilateral lower extremities and L foot. Was previously seen for wound of RLE and cellulitis at urgent care on 08/01/23 and was prescribed Clindamycin. She states following taking the antibiotic the wound and surrounding redness appeared to be improving but after the seven day course was finished she began to have surrounding redness and worsening size of the wound again. Currently band-aids are being used to the LLE and non-stick bandages to the RLE. Currently she sleeps on a couch and tries to elevate her legs as best as she can; is not currently sleeping in bed because there is not air conditioning in the room with her bed and she gets too warm. Has not been wearing compression as the compression socks are too difficult to get on and off. Sugars are uncontrolled but has recently established with new family doctor whom referred pt to endocrinology; the soonest appointment is in January for her desired office closest to home. Checks her sugars three times daily and is usually above 300s. Is not currently on a diuretic, was discontinued after she was hospitalized for acute kidney injury. Smokes about 1 ppd. Pt is currently wearing brace for stability given she has drop foot of her L foot related to herniated discs (is not able to get cleared for surgery yet given her uncontrolled BP and sugars). The boot has been rubbing on her dorsal foot which has created a wound in the past but this area is not currently draining. Denies fevers, chills. 9/18/2023: Follow-up venous stasis ulcer of the right lower extremity with resolving cellulitis. No fever or chills. Doing well with silver alginate and Coflex lite. Also had pressure injuries of the dorsum of the left foot and also of the left calf secondary to ill fitting CAM Walker which she has been using for a very long time.   Patient has been treated for hypertension and is poorly controlled. She states that it is never under control despite multiple medications. For this reason she is unable to have fusion of her left ankle. In addition, she did not take her BP medications this morning. She is asymptomatic with severely elevated blood pressure.       The following portions of the patient's history were reviewed and updated as appropriate:   Patient Active Problem List   Diagnosis   • Arterial insufficiency of lower extremity (HCC)   • Class 3 severe obesity due to excess calories with serious comorbidity and body mass index (BMI) of 50.0 to 59.9 in adult [facility])   • Marijuana use   • Abscess of back   • Primary hypertension   • Hypothyroidism   • Type 2 diabetes mellitus with diabetic neuropathy, with long-term current use of insulin (HCC)   • Pure hypercholesterolemia   • Stage 3a chronic kidney disease (HCC)   • Acute kidney injury ([address])   • Hypomagnesemia   • Hyponatremia   • Lactic acidosis   • Uncontrolled hypertension     Past Medical History:   Diagnosis Date   • Abscess of left thigh    • Abscess, scalp    • Diabetes mellitus ([address])    • Diabetes mellitus, type 2 (HCC)    • Hypertension    • Hypothyroidism    • Renal disorder    • Severe obesity (BMI >= 40) ([facility])      Past Surgical History:   Procedure Laterality Date   • ABCESS DRAINAGE  12/2022    scalp and thigh   • HIP SURGERY Left     fracture- june 2008   • SHOULDER SURGERY       Family History   Problem Relation Age of Onset   • Cancer Mother    • Diabetes Mother    • Cancer Father    • Other Father         aortic insufficiency      Social History     Socioeconomic History   • Marital status: Single     Spouse name: None   • Number of children: None   • Years of education: None   • Highest education level: Some college, no degree   Occupational History   • None   Tobacco Use   • Smoking status: Every Day     Packs/day: 1.00     Years: 20.00     Total pack years: 20.00     Types: Cigarettes   • Smokeless tobacco: Never Vaping Use   • Vaping Use: Never used   Substance and Sexual Activity   • Alcohol use: Never   • Drug use: Yes     Types: Marijuana   • Sexual activity: Not Currently   Other Topics Concern   • None   Social History Narrative    Live house- with parents        Reports has disability due to drop foot- via [facility]     Social Determinants of Health     Financial Resource Strain: Not on file   Food Insecurity: No Food Insecurity (2/18/2023)    Hunger Vital Sign    • Worried About Running Out of Food in the Last Year: Never true    • Ran Out of Food in the Last Year: Never true   Transportation Needs: No Transportation Needs (2/18/2023)    PRAPARE - Transportation    • Lack of Transportation (Medical): No    • Lack of Transportation (Non-Medical):  No   Physical Activity: Not on file   Stress: Not on file   Social Connections: Not on file   Intimate Partner Violence: Not on file   Housing Stability: Low Risk  (2/18/2023)    Housing Stability Vital Sign    • Unable to Pay for Housing in the Last Year: No    • Number of Places Lived in the Last Year: 1    • Unstable Housing in the Last Year: No        Current Outpatient Medications:   •  amLODIPine (NORVASC) 10 mg tablet, , Disp: , Rfl:   •  amLODIPine (NORVASC) 2.5 mg tablet, Take 2.5 mg by mouth daily Reports taking a 2.5mg tab with a 5mg tab to = 7.5 daily, Disp: , Rfl:   •  amLODIPine (NORVASC) 5 mg tablet, Take 5 mg by mouth daily Reports taking a 2.5mg tab with a 5mg tab to = 7.5 daily, Disp: , Rfl:   •  Comfort EZ Pen Needles 32G X 4 MM MISC, , Disp: , Rfl:   •  doxycycline hyclate (VIBRAMYCIN) 100 mg capsule, Take 1 capsule (100 mg total) by mouth every 12 (twelve) hours for 7 days, Disp: 14 capsule, Rfl: 0  •  gabapentin (NEURONTIN) 600 MG tablet, 600 mg 2 (two) times a day, Disp: , Rfl: 5  •  glucose blood test strip, e11.65 (Type 2 Diabetes) Test daily before meals 3 times a day, Disp: , Rfl:   •  GNP Alcohol Swabs 70 % PADS, , Disp: , Rfl:   •  insulin aspart (NovoLOG FlexPen) 100 UNIT/ML injection pen, TAKE EXTRA INSULIN BASED ON BG RESULTS PER SCALE PROVIDED (ISF 10-80 MG/DL OPTIONS). E11.65 (TYPE 2 DIABETES) -200 = 1 UNIT HUMALOG -250 =<MORE>, Disp: , Rfl:   •  Lancets 30G MISC, , Disp: , Rfl:   •  levothyroxine (Synthroid) 25 mcg/mL SUSP, , Disp: , Rfl:   •  metFORMIN (GLUCOPHAGE-XR) 500 mg 24 hr tablet, , Disp: , Rfl:   •  Metoprolol Tartrate 75 MG TABS, 75 mg every 12 (twelve) hours, Disp: , Rfl: 3  No current facility-administered medications for this visit. Review of Systems   Constitutional: Negative for chills and fever. Cardiovascular: Positive for leg swelling. Musculoskeletal: Positive for back pain. Skin: Positive for color change (redness surrounding RLE wound) and wound (bilateral lower legs). Neurological: Positive for weakness (drop foot of L foot) and numbness (peripheral neuropathy of LE). Psychiatric/Behavioral:        +depressed mood       Objective:  BP (!) 195/105 Comment: Did not take BP meds this morning. Denies headaches or pain  Pulse 87   Temp (!) 97 °F (36.1 °C)   Resp 16   Pain Score: 0-No pain     Physical Exam  Vitals reviewed. Constitutional:       General: She is not in acute distress. Appearance: She is morbidly obese. Cardiovascular:      Rate and Rhythm: Normal rate. Pulses:           Dorsalis pedis pulses are 2+ on the right side and 2+ on the left side. Posterior tibial pulses are 2+ on the right side and 2+ on the left side. Pulmonary:      Effort: Pulmonary effort is normal. No respiratory distress. Musculoskeletal:      Right lower leg: Edema present. Left lower leg: Edema present. Skin:     Findings: Erythema and wound present. Comments: RLE venous ulceration. Majority of wound bed is partial thickness with few scattered areas that are deeper with slough. Moderate-heavy serous drainage.  There is surrounding erythema consistent with cellulitis, however overall improvement. VSU of E. Few scattered areas of skin breakdown with slough and serous drainage on medial calf. Minimal surrounding erythema is present. Stage 2 pressure injury of dorsal aspect of L foot related to rubbing of leg brace. Slight drainage of ulcer wound present on L foot and to very small areas on the left medial calf. Neurological:      Mental Status: She is alert. Gait: Gait abnormal (uses brace on L foot d/t foot drop). Psychiatric:         Mood and Affect: Affect is tearful. Speech: Speech is rapid and pressured. Wound 09/11/23 Venous Ulcer Leg Right; Lower (Active)   Wound Image Images linked 09/18/23 1053   Wound Description Yellow;Epithelialization;Granulation tissue;Pink;Slough 09/18/23 1056   Essie-wound Assessment Edema; Erythema; Maceration 09/18/23 1056   Wound Length (cm) 11.9 cm 09/18/23 1056   Wound Width (cm) 12.5 cm 09/18/23 1056   Wound Depth (cm) 0.1 cm 09/18/23 1056   Wound Surface Area (cm^2) 148.75 cm^2 09/18/23 1056   Wound Volume (cm^3) 14.875 cm^3 09/18/23 1056   Calculated Wound Volume (cm^3) 14.88 cm^3 09/18/23 1056   Change in Wound Size % 62.42 09/18/23 1056   Drainage Amount Large 09/18/23 1056   Drainage Description Serous; Yellow 09/18/23 1056   Non-staged Wound Description Full thickness 09/18/23 1056   Patient Tolerance Tolerated well 09/18/23 1056   Dressing Status Intact; Old drainage 09/18/23 1056       Wound 09/11/23 Venous Ulcer Leg Left; Lower (Active)   Wound Image Images linked 09/18/23 1054   Wound Description Epithelialization;Yellow;Slough;Pink 09/18/23 1058   Essie-wound Assessment Edema 09/18/23 1058   Wound Length (cm) 3.4 cm (clustered measurements) 09/18/23 1058   Wound Width (cm) 0.4 cm 09/18/23 1058   Wound Depth (cm) 0.1 cm 09/18/23 1058   Wound Surface Area (cm^2) 1.36 cm^2 09/18/23 1058   Wound Volume (cm^3) 0.136 cm^3 09/18/23 1058   Calculated Wound Volume (cm^3) 0.14 cm^3 09/18/23 1058   Change in Wound Size % 90.73 09/18/23 1058   Drainage Amount Small 09/18/23 1058   Drainage Description Serous; Yellow 09/18/23 1058   Non-staged Wound Description Full thickness 09/18/23 1058   Dressing Status Intact; Old drainage 09/18/23 1058       Wound 09/11/23 Pressure Injury Foot Left;Dorsal (Active)   Wound Image Images linked 09/18/23 1054   Wound Description Golinda 09/18/23 1057   Essie-wound Assessment Edema;Scaly;Dry;Erythema 09/18/23 1057   Wound Length (cm) 0.2 cm 09/18/23 1057   Wound Width (cm) 0.5 cm 09/18/23 1057   Wound Depth (cm) 0.1 cm 09/18/23 1057   Wound Surface Area (cm^2) 0.1 cm^2 09/18/23 1057   Wound Volume (cm^3) 0.01 cm^3 09/18/23 1057   Calculated Wound Volume (cm^3) 0.01 cm^3 09/18/23 1057   Drainage Amount Small 09/18/23 1057   Drainage Description Serous; Yellow 09/18/23 1057   Non-staged Wound Description Full thickness 09/18/23 1057   Dressing Status Intact; Old drainage 09/18/23 1057       Debridement   Wound 09/11/23 Venous Ulcer Leg Right; Lower    Universal Protocol:  Consent: Verbal consent obtained. Written consent obtained. Consent given by: patient  Time out: Immediately prior to procedure a "time out" was called to verify the correct patient, procedure, equipment, support staff and site/side marked as required.   Patient understanding: patient states understanding of the procedure being performed  Patient identity confirmed: verbally with patient      Performed by: physician  Debridement type: selective  Pain control: lidocaine 4%  Post-debridement measurements  Length (cm): 11.9  Width (cm): 12.5  Depth (cm): 0.1  Percent debrided: 30%  Surface Area (cm^2): 148.75  Area debrided (cm^2): 44.63  Volume (cm^3): 14.88  Devitalized tissue debrided: exudate, fibrin and slough  Instrument(s) utilized: curette  Bleeding: small  Hemostasis obtained with: not applicable  Procedural pain (0-10): 0  Post-procedural pain: 0   Response to treatment: procedure was tolerated well Results from last 6 Months   Lab Units 09/11/23  1538   WOUND CULTURE  No growth         Lab Results   Component Value Date    HGBA1C 13.2 (H) 12/07/2022       Wound Instructions:  Orders Placed This Encounter   Procedures   • Wound cleansing and dressings     Wound cleansing and dressings      Wash your hands with soap and water. Remove old dressing, discard into plastic bag and place in trash. Cleanse the wound with NSS or wound cleanser prior to applying a clean dressing. May wash legs after removing the wraps with mild soap and water, rinse well and pat dry       Right LE ulcer  Apply Silver Alginate to the open draining areas. Cover with gauze/ABD pads  Secure with Coflex Lite  Change dressing 2 x weekly and as needed for excessive drainage     Left LE Ulcer:  Bacitracin to area just for today at the wound center. Apply Silicone bordered foam dressing. Change dressing 2 x weekly and as needed for excessive drainage  Followed by Spandagrip Size G     Left Foot ulcer:  Bacitracin to area just for today at the wound center. Apply Silicone bordered foam dressing. Change dressing 2 x weekly and as needed for excessive drainage            Treatments completed as above at the [facility] today            May walk short distance throughout the day.   Recommend cut back on smoking each day to assist with the healing         Wound compression and edema control      Multi-layer compression wrap Instructions---Coflex Lite to RLE     Keep compression wrap/wraps clean and dry. If wraps are too tight and you experience numbness/tingling, call the wound center. If after hours, remove wraps or proceed to nearest E.R. and call wound center in AM.   Wrap will be changed 2x weekly  Avoid prolonged standing in one place.   Elevate leg(s) above the level of the heart when sitting or as much as possible.           Elastic Tubular Stocking---Spandagrip Size G to the LLE   Tubular elastic bandage: Apply from base of toes to behind the knee. Apply in AM, may remove for sleep.   Avoid prolonged standing in one place.   Elevate leg(s) above the level of the heart when sitting or as much as possible. Standing Status:   Future     Standing Expiration Date:   9/18/2024   • Debridement     This order was created via procedure documentation             Светлана Davila MD, CHT, CWS    Portions of the record may have been created with voice recognition software. Occasional wrong word or "sound alike" substitutions may have occurred due to the inherent limitations of voice recognition software. Read the chart carefully and recognize, using context, where substitutions have occurred.

## 2023-09-21 ENCOUNTER — HOME CARE VISIT (OUTPATIENT)
Dept: HOME HEALTH SERVICES | Facility: HOME HEALTHCARE | Age: 38
End: 2023-09-21
Payer: MEDICARE

## 2023-09-21 VITALS
SYSTOLIC BLOOD PRESSURE: 120 MMHG | HEART RATE: 60 BPM | OXYGEN SATURATION: 98 % | TEMPERATURE: 97.1 F | DIASTOLIC BLOOD PRESSURE: 80 MMHG

## 2023-09-21 PROCEDURE — G0299 HHS/HOSPICE OF RN EA 15 MIN: HCPCS

## 2023-09-21 NOTE — CASE COMMUNICATION
Ship to Pt.  Home   Insurance [facility]          Silicone Foam with border 4x4 NOT STOCKED [id number]   6  abd PAD 8X10 IN    6  Tubigrip G  1 box

## 2023-09-25 ENCOUNTER — HOME CARE VISIT (OUTPATIENT)
Dept: HOME HEALTH SERVICES | Facility: HOME HEALTHCARE | Age: 38
End: 2023-09-25
Payer: MEDICARE

## 2023-09-26 ENCOUNTER — OFFICE VISIT (OUTPATIENT)
Dept: WOUND CARE | Facility: CLINIC | Age: 38
End: 2023-09-26
Payer: MEDICARE

## 2023-09-26 VITALS — RESPIRATION RATE: 20 BRPM | SYSTOLIC BLOOD PRESSURE: 153 MMHG | DIASTOLIC BLOOD PRESSURE: 94 MMHG | HEART RATE: 73 BPM

## 2023-09-26 DIAGNOSIS — I87.2 EDEMA OF BOTH LOWER EXTREMITIES DUE TO PERIPHERAL VENOUS INSUFFICIENCY: ICD-10-CM

## 2023-09-26 DIAGNOSIS — L89.892 PRESSURE INJURY OF DORSUM OF LEFT FOOT, STAGE 2 (HCC): ICD-10-CM

## 2023-09-26 DIAGNOSIS — L85.3 XEROSIS OF SKIN: ICD-10-CM

## 2023-09-26 DIAGNOSIS — I83.018 VENOUS STASIS ULCER OF OTHER PART OF RIGHT LOWER LEG WITH FAT LAYER EXPOSED, UNSPECIFIED WHETHER VARICOSE VEINS PRESENT (HCC): Primary | ICD-10-CM

## 2023-09-26 DIAGNOSIS — L97.822 VENOUS STASIS ULCER OF OTHER PART OF LEFT LOWER LEG WITH FAT LAYER EXPOSED, UNSPECIFIED WHETHER VARICOSE VEINS PRESENT (HCC): ICD-10-CM

## 2023-09-26 DIAGNOSIS — L97.812 VENOUS STASIS ULCER OF OTHER PART OF RIGHT LOWER LEG WITH FAT LAYER EXPOSED, UNSPECIFIED WHETHER VARICOSE VEINS PRESENT (HCC): Primary | ICD-10-CM

## 2023-09-26 DIAGNOSIS — I83.028 VENOUS STASIS ULCER OF OTHER PART OF LEFT LOWER LEG WITH FAT LAYER EXPOSED, UNSPECIFIED WHETHER VARICOSE VEINS PRESENT (HCC): ICD-10-CM

## 2023-09-26 PROBLEM — I83.009 VENOUS STASIS ULCER WITH FAT LAYER EXPOSED (HCC): Status: ACTIVE | Noted: 2023-09-26

## 2023-09-26 PROBLEM — L97.902 VENOUS STASIS ULCER WITH FAT LAYER EXPOSED (HCC): Status: ACTIVE | Noted: 2023-09-26

## 2023-09-26 PROCEDURE — 11042 DBRDMT SUBQ TIS 1ST 20SQCM/<: CPT | Performed by: STUDENT IN AN ORGANIZED HEALTH CARE EDUCATION/TRAINING PROGRAM

## 2023-09-26 PROCEDURE — 11045 DBRDMT SUBQ TISS EACH ADDL: CPT | Performed by: STUDENT IN AN ORGANIZED HEALTH CARE EDUCATION/TRAINING PROGRAM

## 2023-09-26 RX ORDER — AMMONIUM LACTATE 12 G/100G
LOTION TOPICAL DAILY
Qty: 396 G | Refills: 1 | Status: SHIPPED | OUTPATIENT
Start: 2023-09-26 | End: 2023-10-26

## 2023-09-26 RX ORDER — LIDOCAINE 40 MG/G
CREAM TOPICAL ONCE
Status: COMPLETED | OUTPATIENT
Start: 2023-09-26 | End: 2023-09-26

## 2023-09-26 RX ADMIN — LIDOCAINE: 40 CREAM TOPICAL at 11:57

## 2023-09-26 NOTE — PROGRESS NOTES
Patient ID: Renita Valiente is a 43 y.o. female Date of Birth 1985       Chief Complaint   Patient presents with   • Follow Up Wound Care Visit     Bilateral lower leg ulcers and left foot. Allergies:  Bee venom and Wasp venom    Diagnosis:   Diagnosis ICD-10-CM Associated Orders   1. Venous stasis ulcer of other part of right lower leg with fat layer exposed, unspecified whether varicose veins present ([facility])  I83.018 lidocaine (LMX) 4 % cream    L97.812 Wound cleansing and dressings     Durable Medical Equipment      2. Edema of both lower extremities due to peripheral venous insufficiency  I87.2 lidocaine (LMX) 4 % cream     Wound cleansing and dressings     Durable Medical Equipment      3. Venous stasis ulcer of other part of left lower leg with fat layer exposed, unspecified whether varicose veins present ([facility])  I83.028 lidocaine (LMX) 4 % cream    L97.822 Wound cleansing and dressings     Durable Medical Equipment      4. Xerosis of skin  L85.3 ammonium lactate (LAC-HYDRIN) 12 % lotion      5. Pressure injury of dorsum of left foot, stage 2 ([address])  M43.744            Assessment  & Plan:    · Follow-up venous ulceration of the right lower extremity. Has improved in size. Remaining open areas with slough overlying granulation tissue. Drainage is moderate to heavy periwound with resolved erythema and dry scaling skin. · Surgical debridement, as below. · Silver alginate and Coflex lite for compression. Change 3 times weekly. · Recommend obtaining 20 to 30 mmHg compression stockings to wear for maintenance following healing. · Follow-up left medial calf venous ulceration. Is now healed. There is no drainage from the site. Scattered areas of postinflammatory hyperpigmentation present without erythema to suggest acute soft tissue infection. · Spandagrip for compression and recommend obtaining compression stockings for daily use. · Follow-up pressure injury of left dorsal foot. Is now healed. There is no drainage coming from the site of the prior wound. Skin continues to appear irritated with small area of maceration. Skin is dry and scaling. · Continue to cover the area with an Allevyn bordered foam.  May discontinue use of bacitracin. · Recommend seeing podiatry for evaluation of CAM Walker boot as it is ill fitting and contributing to friction. · Lac-Hydrin prescribed for dry scaling skin. · F/u in one week. Instructed to call if any questions or concerns arise in meantime. Subjective:   09/11/23: 42 y/o F with PMHx of hypothyroidism, uncontrolled DM with peripheral neuropathy, CKD, HTN, obesity, arterial insufficiency of lower extremity, hx of multiple abscesses, presents for evaluation of wounds of bilateral lower extremities and L foot. Was previously seen for wound of RLE and cellulitis at urgent care on 08/01/23 and was prescribed Clindamycin. She states following taking the antibiotic the wound and surrounding redness appeared to be improving but after the seven day course was finished she began to have surrounding redness and worsening size of the wound again. Currently band-aids are being used to the LLE and non-stick bandages to the RLE. Currently she sleeps on a couch and tries to elevate her legs as best as she can; is not currently sleeping in bed because there is not air conditioning in the room with her bed and she gets too warm. Has not been wearing compression as the compression socks are too difficult to get on and off. Sugars are uncontrolled but has recently established with new family doctor whom referred pt to endocrinology; the soonest appointment is in January for her desired office closest to home. Checks her sugars three times daily and is usually above 300s. Is not currently on a diuretic, was discontinued after she was hospitalized for acute kidney injury. Smokes about 1 ppd.  Pt is currently wearing brace for stability given she has drop foot of her L foot related to herniated discs (is not able to get cleared for surgery yet given her uncontrolled BP and sugars). The boot has been rubbing on her dorsal foot which has created a wound in the past but this area is not currently draining. Denies fevers, chills. 9/18/2023: Follow-up venous stasis ulcer of the right lower extremity with resolving cellulitis. No fever or chills. Doing well with silver alginate and Coflex lite. Also had pressure injuries of the dorsum of the left foot and also of the left calf secondary to ill fitting CAM Walker which she has been using for a very long time. Patient has been treated for hypertension and is poorly controlled. She states that it is never under control despite multiple medications. For this reason she is unable to have fusion of her left ankle. In addition, she did not take her BP medications this morning. She is asymptomatic with severely elevated blood pressure. 09/26/23: Follow-up venous stasis ulcer of the right lower extremity. Continues to do well with silver alginate and Coflex lite, however patient notes that the Coflex wrap falls down after about 2 days. Patient plans to call for a podiatry appointment in order to have cam boot evaluated. Sugars remain high. Pressure remains high. Has an upcoming appointment with her PCP regarding her sugars and blood pressure. Denies headache, blurred vision, chest pain, shortness of breath or any numbness or tingling beyond her baseline. Was able to get on the wait list for psychiatry regarding her depression post passing of her dog.          The following portions of the patient's history were reviewed and updated as appropriate:   Patient Active Problem List   Diagnosis   • Arterial insufficiency of lower extremity (HCC)   • Class 3 severe obesity due to excess calories with serious comorbidity and body mass index (BMI) of 50.0 to 59.9 in [facility])   • Marijuana use   • Abscess of back   • Primary hypertension • Hypothyroidism   • Type 2 diabetes mellitus with diabetic neuropathy, with long-term current use of insulin ([facility])   • Pure hypercholesterolemia   • Stage 3a chronic kidney disease ([facility])   • Acute kidney injury ([address])   • Hypomagnesemia   • Hyponatremia   • Lactic acidosis   • Uncontrolled hypertension   • Venous stasis ulcer with fat layer exposed ([address])   • Edema of both lower extremities due to peripheral venous insufficiency     Past Medical History:   Diagnosis Date   • Abscess of left thigh    • Abscess, scalp    • Diabetes mellitus ([address])    • Diabetes mellitus, type 2 ([facility])    • Hypertension    • Hypothyroidism    • Renal disorder    • Severe obesity (BMI >= 40) ([facility])      Past Surgical History:   Procedure Laterality Date   • ABCESS DRAINAGE  12/2022    scalp and thigh   • HIP SURGERY Left     fracture- june 2008   • SHOULDER SURGERY       Family History   Problem Relation Age of Onset   • Cancer Mother    • Diabetes Mother    • Cancer Father    • Other Father         aortic insufficiency     Social History     Socioeconomic History   • Marital status: Single     Spouse name: Not on file   • Number of children: Not on file   • Years of education: Not on file   • Highest education level: Some college, no degree   Occupational History   • Not on file   Tobacco Use   • Smoking status: Every Day     Packs/day: 1.00     Years: 20.00     Total pack years: 20.00     Types: Cigarettes   • Smokeless tobacco: Never   Vaping Use   • Vaping Use: Never used   Substance and Sexual Activity   • Alcohol use: Never   • Drug use: Yes     Types: Marijuana   • Sexual activity: Not Currently   Other Topics Concern   • Not on file   Social History Narrative    Live house- with parents        Reports has disability due to drop foot- via [facility]     Social Determinants of Health     Financial Resource Strain: Not on file   Food Insecurity: No Food Insecurity (2/18/2023)    Hunger Vital Sign    • Worried About Running Out of Food in the Last Year: Never true    • Ran Out of Food in the Last Year: Never true   Transportation Needs: No Transportation Needs (2/18/2023)    PRAPARE - Transportation    • Lack of Transportation (Medical): No    • Lack of Transportation (Non-Medical): No   Physical Activity: Not on file   Stress: Not on file   Social Connections: Not on file   Intimate Partner Violence: Not on file   Housing Stability: Low Risk  (2/18/2023)    Housing Stability Vital Sign    • Unable to Pay for Housing in the Last Year: No    • Number of Places Lived in the Last Year: 1    • Unstable Housing in the Last Year: No       Current Outpatient Medications:   •  ammonium lactate (LAC-HYDRIN) 12 % lotion, Apply topically daily To dry scaling skin of legs and feet, Disp: 396 g, Rfl: 1  •  amLODIPine (NORVASC) 10 mg tablet, , Disp: , Rfl:   •  amLODIPine (NORVASC) 2.5 mg tablet, Take 2.5 mg by mouth daily Reports taking a 2.5mg tab with a 5mg tab to = 7.5 daily, Disp: , Rfl:   •  amLODIPine (NORVASC) 5 mg tablet, Take 5 mg by mouth daily Reports taking a 2.5mg tab with a 5mg tab to = 7.5 daily, Disp: , Rfl:   •  Comfort EZ Pen Needles 32G X 4 MM MISC, , Disp: , Rfl:   •  gabapentin (NEURONTIN) 600 MG tablet, 600 mg 2 (two) times a day, Disp: , Rfl: 5  •  glucose blood test strip, e11.65 (Type 2 Diabetes) Test daily before meals 3 times a day, Disp: , Rfl:   •  GNP Alcohol Swabs 70 % PADS, , Disp: , Rfl:   •  insulin aspart (NovoLOG FlexPen) 100 UNIT/ML injection pen, TAKE EXTRA INSULIN BASED ON BG RESULTS PER SCALE PROVIDED (ISF 10-80 MG/DL OPTIONS).  E11.65 (TYPE 2 DIABETES) -200 = 1 UNIT HUMALOG -250 =<MORE>, Disp: , Rfl:   •  Lancets 30G MISC, , Disp: , Rfl:   •  levothyroxine (Synthroid) 25 mcg/mL SUSP, , Disp: , Rfl:   •  metFORMIN (GLUCOPHAGE-XR) 500 mg 24 hr tablet, , Disp: , Rfl:   •  Metoprolol Tartrate 75 MG TABS, 75 mg every 12 (twelve) hours, Disp: , Rfl: 3  No current facility-administered medications for this visit. Review of Systems   Constitutional: Negative for chills and fever. Eyes: Negative for visual disturbance. Respiratory: Negative for shortness of breath. Cardiovascular: Positive for leg swelling. Negative for chest pain. Musculoskeletal: Positive for back pain. Skin: Positive for color change (redness surrounding RLE wound) and wound (bilateral lower legs). Neurological: Positive for weakness (drop foot of L foot) and numbness (peripheral neuropathy of LE). Negative for headaches. Psychiatric/Behavioral:        +depressed mood         Objective:  /94   Pulse 73   Resp 20   Pain Score: 0-No pain     Physical Exam  Vitals reviewed. Constitutional:       General: She is not in acute distress. Appearance: She is morbidly obese. Cardiovascular:      Rate and Rhythm: Normal rate. Pulses:           Dorsalis pedis pulses are 2+ on the right side and 2+ on the left side. Posterior tibial pulses are 2+ on the right side and 2+ on the left side. Pulmonary:      Effort: Pulmonary effort is normal. No respiratory distress. Musculoskeletal:      Right lower leg: Edema present. Left lower leg: Edema present. Skin:     Findings: Wound present. No erythema. Comments: RLE venous ulceration. Measuring smaller in size. New edge epithelization present. Remaining scattered open areas that are deeper with slough. Moderate-heavy serous drainage. Previous erythema has resolved. No lymphangitic streaking. VSU of LLE is now healed. There is no drainage. Post inflammatory hyperpigmentation present but no dinorah erythema. Stage 2 pressure injury of dorsal aspect of L foot related to rubbing of leg brace. Small area of macerated skin but no drainage from the site. Skin appears irritated. Neurological:      Mental Status: She is alert. Gait: Gait abnormal (uses brace on L foot d/t foot drop).    Psychiatric:         Mood and Affect: Affect is tearful. Speech: Speech is rapid and pressured. Wound 09/11/23 Venous Ulcer Leg Right; Lower (Active)   Wound Image    09/26/23 1128   Wound Description Yellow;Epithelialization;Granulation tissue;Pink;Slough 09/26/23 1134   Essie-wound Assessment Edema; Erythema; Maceration;Scaly;Scar Tissue 09/26/23 1134   Wound Length (cm) 7.3 cm 09/26/23 1134   Wound Width (cm) 13 cm 09/26/23 1134   Wound Depth (cm) 0.1 cm 09/26/23 1134   Wound Surface Area (cm^2) 94.9 cm^2 09/26/23 1134   Wound Volume (cm^3) 9.49 cm^3 09/26/23 1134   Calculated Wound Volume (cm^3) 9.49 cm^3 09/26/23 1134   Change in Wound Size % 76.04 09/26/23 1134   Drainage Amount Moderate 09/26/23 1134   Drainage Description Yellow;Brown;Serosanguineous 09/26/23 1134   Non-staged Wound Description Full thickness 09/26/23 1134   Treatments Cleansed 09/26/23 1134   Patient Tolerance Tolerated well 09/26/23 1134   Dressing Status Intact; Old drainage 09/18/23 1056       Wound 09/11/23 Venous Ulcer Leg Left; Lower (Active)   Wound Image   09/26/23 1127   Wound Description Epithelialization 09/26/23 1136   Essie-wound Assessment Edema 09/26/23 1136   Wound Length (cm) 0 cm 09/26/23 1136   Wound Width (cm) 0 cm 09/26/23 1136   Wound Depth (cm) 0 cm 09/26/23 1136   Wound Surface Area (cm^2) 0 cm^2 09/26/23 1136   Wound Volume (cm^3) 0 cm^3 09/26/23 1136   Calculated Wound Volume (cm^3) 0 cm^3 09/26/23 1136   Change in Wound Size % 100 09/26/23 1136   Drainage Amount None 09/26/23 1136   Drainage Description Serous; Yellow 09/18/23 1058   Non-staged Wound Description Full thickness 09/18/23 1058   Treatments Cleansed 09/26/23 1136   Patient Tolerance Tolerated well 09/26/23 1136   Dressing Status Intact 09/26/23 1136       Wound 09/11/23 Pressure Injury Foot Left;Dorsal (Active)   Wound Image   09/26/23 1127   Wound Description Epithelialization 09/26/23 1137   Pressure Injury Stage 1 09/11/23 1430   Essie-wound Assessment Edema;Scaly; Maceration 09/26/23 1137   Wound Length (cm) 0 cm 09/26/23 1137   Wound Width (cm) 0 cm 09/26/23 1137   Wound Depth (cm) 0 cm 09/26/23 1137   Wound Surface Area (cm^2) 0 cm^2 09/26/23 1137   Wound Volume (cm^3) 0 cm^3 09/26/23 1137   Calculated Wound Volume (cm^3) 0 cm^3 09/26/23 1137   Drainage Amount Small 09/18/23 1057   Drainage Description Serous; Yellow 09/18/23 1057   Non-staged Wound Description Full thickness 09/18/23 1057   Treatments Cleansed 09/26/23 1137   Patient Tolerance Tolerated well 09/26/23 1137   Dressing Status Intact 09/26/23 1137                Debridement   Wound 09/11/23 Venous Ulcer Leg Right; Lower    Universal Protocol:  Consent: Verbal consent obtained. Consent given by: patient  Time out: Immediately prior to procedure a "time out" was called to verify the correct patient, procedure, equipment, support staff and site/side marked as required. Patient understanding: patient states understanding of the procedure being performed  Patient identity confirmed: verbally with patient      Performed by: PA  Debridement type: surgical  Level of debridement: subcutaneous tissue  Pain control: lidocaine 4%  Post-debridement measurements  Length (cm): 7.3  Width (cm): 13  Depth (cm): 0.2  Percent debrided: 40%  Surface Area (cm^2): 94.9  Area debrided (cm^2): 37.96  Volume (cm^3): 18.98  Tissue and other material debrided: dermis and subcutaneous tissue  Devitalized tissue debrided: slough  Instrument(s) utilized: curette  Bleeding: small  Hemostasis obtained with: pressure  Procedural pain (0-10): 0  Post-procedural pain: 0   Response to treatment: procedure was tolerated well             No post debridement photo available.    Results from last 6 Months   Lab Units 09/11/23  1538   WOUND CULTURE  No growth           Wound Instructions:  Orders Placed This Encounter   Procedures   • Wound cleansing and dressings     Wash your hands with soap and water. Kassie Creed old dressing, discard into plastic bag and place in trash.     Cleanse the wound with NSS or wound cleanser prior to applying a clean dressing. May wash legs after removing the wraps with mild soap and water, rinse well and pat dry       Apply Lac hydrin to the L lower leg dry areas daily     Wash right lower leg with Hibeclens today only   Right Lower leg ulcer     Apply Silver Alginate to the open draining areas.  Cover with gauze/ABD pads   Secure with Coflex Lite   Change dressing 3 x weekly and as needed for excessive drainage       Left Lower leg Ulcer is healed today       Left Foot ulcer is healed today   Apply Silicone bordered foam dressing for protection  Change dressing 1 x weekly and as needed for drainage       Treatments completed as above at the [facility] today         May walk short distance throughout the day.    Recommend cut back on smoking each day to assist with the healing           Wound compression and edema control       Multi-layer compression wrap Instructions---Coflex Lite to RLE       Keep compression wrap/wraps clean and dry. If wraps are too tight and you experience numbness/tingling, call the wound center. If after hours, remove wraps or proceed to nearest E.R. and call wound center in AM.    Wrap will be changed 3 x weekly (2 times with HHC and   Avoid prolonged standing in one place. Elevate leg(s) above the level of the heart when sitting or as much as possible.         Elastic Tubular Stocking---Spandagrip Size G to the LLE   Tubular elastic bandage: Apply from base of toes to behind the knee. Apply in AM, may remove for sleep. Avoid prolonged standing in one place.    Elevate leg(s) above the level of the heart when sitting or as much as possible    Continue HHC increase to 2 times per week     Standing Status:   Future     Standing Expiration Date:   9/26/2024   • Durable Medical Equipment     Compression stockings 20-30 mmHg for bilateral lower legs     Order Specific Question:    This patient has an active home care or hospice episode. Should this order be COMPLETED by [address]? Answer:   No       Ranjit Shown, PA-C    Portions of the record may have been created with voice recognition software. Occasional wrong word or "sound alike" substitutions may have occurred due to the inherent limitations of voice recognition software. Read the chart carefully and recognize, using context, where substitutions have occurred.

## 2023-09-26 NOTE — PATIENT INSTRUCTIONS
Orders Placed This Encounter   Procedures    Wound cleansing and dressings     Wash your hands with soap and water. Remove old dressing, discard into plastic bag and place in trash. Cleanse the wound with NSS or wound cleanser prior to applying a clean dressing. May wash legs after removing the wraps with mild soap and water, rinse well and pat dry       Apply Lac hydrin to the L lower leg dry areas daily     Wash right lower leg with Hibeclens today only   Right Lower leg ulcer     Apply Silver Alginate to the open draining areas. Cover with gauze/ABD pads   Secure with Coflex Lite   Change dressing 3 x weekly and as needed for excessive drainage       Left Lower leg Ulcer is healed today       Left Foot ulcer is healed today   Apply Silicone bordered foam dressing for protection  Change dressing 1 x weekly and as needed for drainage       Treatments completed as above at the [facility] today         May walk short distance throughout the day. Recommend cut back on smoking each day to assist with the healing           Wound compression and edema control       Multi-layer compression wrap Instructions---Coflex Lite to RLE       Keep compression wrap/wraps clean and dry. If wraps are too tight and you experience numbness/tingling, call the wound center. If after hours, remove wraps or proceed to nearest E.R. and call wound center in AM.    Wrap will be changed 3 x weekly (2 times with HHC and   Avoid prolonged standing in one place. Elevate leg(s) above the level of the heart when sitting or as much as possible. Elastic Tubular Stocking---Spandagrip Size G to the LLE   Tubular elastic bandage: Apply from base of toes to behind the knee. Apply in AM, may remove for sleep. Avoid prolonged standing in one place.     Elevate leg(s) above the level of the heart when sitting or as much as possible    Continue HHC increase to 2 times per week     Standing Status:   Future     Standing Expiration Date: 9/26/2024

## 2023-09-28 ENCOUNTER — HOME CARE VISIT (OUTPATIENT)
Dept: HOME HEALTH SERVICES | Facility: HOME HEALTHCARE | Age: 38
End: 2023-09-28
Payer: MEDICARE

## 2023-09-28 VITALS
TEMPERATURE: 98.2 F | OXYGEN SATURATION: 91 % | SYSTOLIC BLOOD PRESSURE: 142 MMHG | HEART RATE: 99 BPM | DIASTOLIC BLOOD PRESSURE: 86 MMHG

## 2023-09-28 PROCEDURE — G0299 HHS/HOSPICE OF RN EA 15 MIN: HCPCS

## 2023-09-30 ENCOUNTER — HOME CARE VISIT (OUTPATIENT)
Dept: HOME HEALTH SERVICES | Facility: HOME HEALTHCARE | Age: 38
End: 2023-09-30
Payer: MEDICARE

## 2023-09-30 VITALS
RESPIRATION RATE: 20 BRPM | OXYGEN SATURATION: 98 % | TEMPERATURE: 98.4 F | HEART RATE: 95 BPM | DIASTOLIC BLOOD PRESSURE: 84 MMHG | SYSTOLIC BLOOD PRESSURE: 158 MMHG

## 2023-09-30 PROCEDURE — G0299 HHS/HOSPICE OF RN EA 15 MIN: HCPCS

## 2023-10-02 ENCOUNTER — HOME CARE VISIT (OUTPATIENT)
Dept: HOME HEALTH SERVICES | Facility: HOME HEALTHCARE | Age: 38
End: 2023-10-02
Payer: MEDICARE

## 2023-10-02 ENCOUNTER — OFFICE VISIT (OUTPATIENT)
Dept: WOUND CARE | Facility: CLINIC | Age: 38
End: 2023-10-02
Payer: MEDICARE

## 2023-10-02 VITALS
SYSTOLIC BLOOD PRESSURE: 157 MMHG | RESPIRATION RATE: 20 BRPM | HEART RATE: 72 BPM | TEMPERATURE: 95.3 F | DIASTOLIC BLOOD PRESSURE: 97 MMHG

## 2023-10-02 DIAGNOSIS — L89.892 PRESSURE INJURY OF DORSUM OF LEFT FOOT, STAGE 2 (HCC): ICD-10-CM

## 2023-10-02 DIAGNOSIS — L85.3 XEROSIS OF SKIN: ICD-10-CM

## 2023-10-02 DIAGNOSIS — I83.018 VENOUS STASIS ULCER OF OTHER PART OF RIGHT LOWER LEG WITH FAT LAYER EXPOSED, UNSPECIFIED WHETHER VARICOSE VEINS PRESENT (HCC): Primary | ICD-10-CM

## 2023-10-02 DIAGNOSIS — L97.812 VENOUS STASIS ULCER OF OTHER PART OF RIGHT LOWER LEG WITH FAT LAYER EXPOSED, UNSPECIFIED WHETHER VARICOSE VEINS PRESENT (HCC): Primary | ICD-10-CM

## 2023-10-02 DIAGNOSIS — I87.2 EDEMA OF BOTH LOWER EXTREMITIES DUE TO PERIPHERAL VENOUS INSUFFICIENCY: ICD-10-CM

## 2023-10-02 PROCEDURE — 99213 OFFICE O/P EST LOW 20 MIN: CPT | Performed by: STUDENT IN AN ORGANIZED HEALTH CARE EDUCATION/TRAINING PROGRAM

## 2023-10-02 NOTE — PROGRESS NOTES
Patient ID: Thalia Marie is a 43 y.o. female Date of Birth 1985       Chief Complaint   Patient presents with   • Follow Up Wound Care Visit     Wounds to BLE's       Allergies:  Bee venom and Wasp venom    Diagnosis:   Diagnosis ICD-10-CM Associated Orders   1. Venous stasis ulcer of other part of right lower leg with fat layer exposed, unspecified whether varicose veins present ([facility])  I83.018 Wound cleansing and dressings    L97.812       2. Pressure injury of dorsum of left foot, stage 2 ([facility])  L89.892 Wound cleansing and dressings      3. Edema of both lower extremities due to peripheral venous insufficiency  I87.2       4. Xerosis of skin  L85.3            Assessment  & Plan:    • Follow-up venous ulceration of the right lower extremity. Continues to reduce in size. Two small areas remain open. One small area of trapped purulent drainage which was expressed. Second small area with partial-thickness skin breakdown and granulation tissue with minimal serous drainage. Periwound with dry scaling flaking skin but without erythema.  o Topical antibiotic ointment to remaining open areas. Lac-Hydrin applied to the dry scaling skin. Coflex applied today as Coflex lite is not available. Home health to continue with Coflex lite application changed 3 times weekly. o Plans to obtain 20 to 30 mmHg compression stockings today. • Follow-up injury of the left dorsal foot related to rubbing from ill fitting cam boot. Scant serous drainage present from site. Surrounding skin is dry and scaling. Mild erythema is present from irritation of skin in area of friction however is not diffuse nor is or lymphangitic streaking to suggest acute soft tissue infection. o Continue with allevyn bordered foam.   o Recommend evaluation via podiatry for assessment of CAM walker. • F/u in one week. Instructed to call if any questions or concerns arise in meantime.        Subjective:   09/11/23: 42 y/o F with PMHx of hypothyroidism, uncontrolled DM with peripheral neuropathy, CKD, HTN, obesity, arterial insufficiency of lower extremity, hx of multiple abscesses, presents for evaluation of wounds of bilateral lower extremities and L foot. Was previously seen for wound of RLE and cellulitis at urgent care on 08/01/23 and was prescribed Clindamycin. She states following taking the antibiotic the wound and surrounding redness appeared to be improving but after the seven day course was finished she began to have surrounding redness and worsening size of the wound again. Currently band-aids are being used to the LLE and non-stick bandages to the RLE. Currently she sleeps on a couch and tries to elevate her legs as best as she can; is not currently sleeping in bed because there is not air conditioning in the room with her bed and she gets too warm. Has not been wearing compression as the compression socks are too difficult to get on and off. Sugars are uncontrolled but has recently established with new [facility] doctor whom referred pt to endocrinology; the soonest appointment is in January for her desired office closest to home. Checks her sugars three times daily and is usually above 300s. Is not currently on a diuretic, was discontinued after she was hospitalized for acute kidney injury. Smokes about 1 ppd. Pt is currently wearing brace for stability given she has drop foot of her L foot related to herniated discs (is not able to get cleared for surgery yet given her uncontrolled BP and sugars). The boot has been rubbing on her dorsal foot which has created a wound in the past but this area is not currently draining. Denies fevers, chills. 9/18/2023: Follow-up venous stasis ulcer of the right lower extremity with resolving cellulitis. No fever or chills. Doing well with silver alginate and Coflex lite.   Also had pressure injuries of the dorsum of the left foot and also of the left calf secondary to ill fitting CAM Walker which she has been using for a very long time. Patient has been treated for hypertension and is poorly controlled. She states that it is never under control despite multiple medications. For this reason she is unable to have fusion of her left ankle. In addition, she did not take her BP medications this morning. She is asymptomatic with severely elevated blood pressure. 09/26/23: Follow-up venous stasis ulcer of the right lower extremity. Continues to do well with silver alginate and Coflex lite, however patient notes that the Coflex wrap falls down after about 2 days. Patient plans to call for a podiatry appointment in order to have cam boot evaluated. Sugars remain high. Pressure remains high. Has an upcoming appointment with her PCP regarding her sugars and blood pressure. Denies headache, blurred vision, chest pain, shortness of breath or any numbness or tingling beyond her baseline. Was able to get on the wait list for psychiatry regarding her depression post passing of her dog. 10/02/23: Patient presents for follow-up of venous stasis ulcer of the right lower extremity. Continues to do well with silver alginate and Coflex lite. Has not yet made an appointment with podiatry to assess fit of CAM boot. Patient plans to obtain her 20 to 30 mmHg compression stockings today. Offers no complaints today.         The following portions of the patient's history were reviewed and updated as appropriate:   Patient Active Problem List   Diagnosis   • Arterial insufficiency of lower extremity (HCC)   • Class 3 severe obesity due to excess calories with serious comorbidity and body mass index (BMI) of 50.0 to 59.9 in adult [facility])   • Marijuana use   • Abscess of back   • Primary hypertension   • Hypothyroidism   • Type 2 diabetes mellitus with diabetic neuropathy, with long-term current use of insulin (HCC)   • Pure hypercholesterolemia   • Stage 3a chronic kidney disease ([address])   • Acute kidney injury ([address])   • Hypomagnesemia   • Hyponatremia   • Lactic acidosis   • Uncontrolled hypertension   • Venous stasis ulcer with fat layer exposed (HCC)   • Edema of both lower extremities due to peripheral venous insufficiency     Past Medical History:   Diagnosis Date   • Abscess of left thigh    • Abscess, scalp    • Diabetes mellitus (HCC)    • Diabetes mellitus, type 2 (HCC)    • Hypertension    • Hypothyroidism    • Renal disorder    • Severe obesity (BMI >= 40) (HCC)      Past Surgical History:   Procedure Laterality Date   • ABCESS DRAINAGE  12/2022    scalp and thigh   • HIP SURGERY Left     fracture- june 2008   • SHOULDER SURGERY       Family History   Problem Relation Age of Onset   • Cancer Mother    • Diabetes Mother    • Cancer Father    • Other Father         aortic insufficiency     Social History     Socioeconomic History   • Marital status: Single     Spouse name: None   • Number of children: None   • Years of education: None   • Highest education level: Some college, no degree   Occupational History   • None   Tobacco Use   • Smoking status: Every Day     Packs/day: 1.00     Years: 20.00     Total pack years: 20.00     Types: Cigarettes   • Smokeless tobacco: Never   Vaping Use   • Vaping Use: Never used   Substance and Sexual Activity   • Alcohol use: Never   • Drug use: Yes     Types: Marijuana   • Sexual activity: Not Currently   Other Topics Concern   • None   Social History Narrative    Live house- with parents        Reports has disability due to drop foot- via [facility]     Social Determinants of Health     Financial Resource Strain: Not on file   Food Insecurity: No Food Insecurity (2/18/2023)    Hunger Vital Sign    • Worried About Running Out of Food in the Last Year: Never true    • Ran Out of Food in the Last Year: Never true   Transportation Needs: No Transportation Needs (2/18/2023)    PRAPARE - Transportation    • Lack of Transportation (Medical):  No    • Lack of Transportation (Non-Medical): No   Physical Activity: Not on file   Stress: Not on file   Social Connections: Not on file   Intimate Partner Violence: Not on file   Housing Stability: Low Risk  (2/18/2023)    Housing Stability Vital Sign    • Unable to Pay for Housing in the Last Year: No    • Number of Places Lived in the Last Year: 1    • Unstable Housing in the Last Year: No       Current Outpatient Medications:   •  amLODIPine (NORVASC) 10 mg tablet, , Disp: , Rfl:   •  amLODIPine (NORVASC) 2.5 mg tablet, Take 2.5 mg by mouth daily Reports taking a 2.5mg tab with a 5mg tab to = 7.5 daily, Disp: , Rfl:   •  amLODIPine (NORVASC) 5 mg tablet, Take 5 mg by mouth daily Reports taking a 2.5mg tab with a 5mg tab to = 7.5 daily, Disp: , Rfl:   •  ammonium lactate (LAC-HYDRIN) 12 % lotion, Apply topically daily To dry scaling skin of legs and feet, Disp: 396 g, Rfl: 1  •  Comfort EZ Pen Needles 32G X 4 MM MISC, , Disp: , Rfl:   •  gabapentin (NEURONTIN) 600 MG tablet, 600 mg 2 (two) times a day, Disp: , Rfl: 5  •  glucose blood test strip, e11.65 (Type 2 Diabetes) Test daily before meals 3 times a day, Disp: , Rfl:   •  GNP Alcohol Swabs 70 % PADS, , Disp: , Rfl:   •  insulin aspart (NovoLOG FlexPen) 100 UNIT/ML injection pen, TAKE EXTRA INSULIN BASED ON BG RESULTS PER SCALE PROVIDED (ISF 10-80 MG/DL OPTIONS). E11.65 (TYPE 2 DIABETES) -200 = 1 UNIT HUMALOG -250 =<MORE>, Disp: , Rfl:   •  Lancets 30G MISC, , Disp: , Rfl:   •  levothyroxine (Synthroid) 25 mcg/mL SUSP, , Disp: , Rfl:   •  metFORMIN (GLUCOPHAGE-XR) 500 mg 24 hr tablet, , Disp: , Rfl:   •  Metoprolol Tartrate 75 MG TABS, 75 mg every 12 (twelve) hours, Disp: , Rfl: 3    Review of Systems   Constitutional: Negative for chills and fever. Eyes: Negative for visual disturbance. Respiratory: Negative for shortness of breath. Cardiovascular: Positive for leg swelling. Negative for chest pain. Musculoskeletal: Positive for back pain and gait problem.    Skin: Positive for wound (bilateral lower legs). Neurological: Positive for weakness (drop foot of L foot) and numbness (peripheral neuropathy of LE). Negative for headaches. Psychiatric/Behavioral:        +depressed mood         Objective:  /97   Pulse 72   Temp (!) 95.3 °F (35.2 °C)   Resp 20   Pain Score: 0-No pain     Physical Exam  Vitals reviewed. Constitutional:       General: She is not in acute distress. Appearance: She is morbidly obese. Cardiovascular:      Rate and Rhythm: Normal rate. Pulses:           Dorsalis pedis pulses are 2+ on the right side and 2+ on the left side. Posterior tibial pulses are 2+ on the right side and 2+ on the left side. Pulmonary:      Effort: Pulmonary effort is normal. No respiratory distress. Musculoskeletal:      Right lower leg: Edema present. Left lower leg: Edema present. Skin:     Findings: Wound present. No erythema. Comments: Venous ulceration of the right lower extremity. Continues to reduce in size. Two small areas remain open. One small area of trapped purulent drainage which was expressed. Second small area with partial-thickness skin breakdown and granulation tissue with minimal serous drainage. Periwound with dry scaling flaking skin but without erythema. PI of the left dorsal foot with one small area remaining open. Scant serous drainage present from site. Surrounding skin is dry and scaling. Mild erythema is present without lymphangitic streaking. L medial calf VSU remains healed without drainage or signs of infection. Neurological:      Mental Status: She is alert. Motor: Weakness (L leg) present. Gait: Gait abnormal (uses brace on L foot d/t foot drop). Psychiatric:         Mood and Affect: Affect is tearful. Speech: Speech is rapid and pressured. Wound 09/11/23 Venous Ulcer Leg Right; Lower (Active)   Wound Image   10/02/23 1297   Wound Description Yellow;Pink 10/02/23 1339   Essie-wound Assessment Edema;Dry;Scaly 10/02/23 1339   Wound Length (cm) 0.2 cm 10/02/23 1339   Wound Width (cm) 0.3 cm 10/02/23 1339   Wound Depth (cm) 0.1 cm 10/02/23 1339   Wound Surface Area (cm^2) 0.06 cm^2 10/02/23 1339   Wound Volume (cm^3) 0.006 cm^3 10/02/23 1339   Calculated Wound Volume (cm^3) 0.01 cm^3 10/02/23 1339   Change in Wound Size % 99.97 10/02/23 1339   Drainage Amount Scant 10/02/23 1339   Drainage Description Serous 10/02/23 1339   Non-staged Wound Description Full thickness 10/02/23 1339   Treatments Cleansed 09/26/23 1134   Patient Tolerance Tolerated well 09/26/23 1134   Dressing Status Intact 10/02/23 1339       Wound 09/11/23 Venous Ulcer Leg Left; Lower (Active)   Wound Image   10/02/23 1337   Wound Description Epithelialization 10/02/23 1339   Essie-wound Assessment Edema 10/02/23 1339   Wound Length (cm) 0 cm 10/02/23 1339   Wound Width (cm) 0 cm 10/02/23 1339   Wound Depth (cm) 0 cm 10/02/23 1339   Wound Surface Area (cm^2) 0 cm^2 10/02/23 1339   Wound Volume (cm^3) 0 cm^3 10/02/23 1339   Calculated Wound Volume (cm^3) 0 cm^3 10/02/23 1339   Change in Wound Size % 100 10/02/23 1339   Drainage Amount None 10/02/23 1339   Drainage Description Serous; Yellow 09/18/23 1058   Non-staged Wound Description Not applicable 19/02/65 6257   Treatments Cleansed 09/26/23 1136   Patient Tolerance Tolerated well 09/26/23 1136   Dressing Status Intact 10/02/23 1339       Wound 09/11/23 Pressure Injury Foot Left;Dorsal (Active)   Wound Image   10/02/23 1337   Wound Description Pink 10/02/23 1339   Pressure Injury Stage 1 09/11/23 1430   Essie-wound Assessment Edema;Scaly 10/02/23 1339   Wound Length (cm) 0.1 cm 10/02/23 1339   Wound Width (cm) 0.1 cm 10/02/23 1339   Wound Depth (cm) 0.1 cm 10/02/23 1339   Wound Surface Area (cm^2) 0.01 cm^2 10/02/23 1339   Wound Volume (cm^3) 0.001 cm^3 10/02/23 1339   Calculated Wound Volume (cm^3) 0 cm^3 10/02/23 1339   Drainage Amount Scant 10/02/23 1339 Drainage Description Serous 10/02/23 1339   Non-staged Wound Description Full thickness 10/02/23 1339   Treatments Cleansed 09/26/23 1137   Patient Tolerance Tolerated well 09/26/23 1137   Dressing Status Intact 10/02/23 1339                          Results from last 6 Months   Lab Units 09/11/23  1538   WOUND CULTURE  No growth           Wound Instructions:  Orders Placed This Encounter   Procedures   • Wound cleansing and dressings     Wash your hands with soap and water.       Cleanse the wound with NSS or wound cleanser prior to applying a clean dressing. May wash legs after removing the wraps with mild soap and water, rinse well and pat dry       Apply Lac hydrin to the L lower leg dry areas daily   Left Leg wound healed today     Right Lower leg ulcer     Apply Silver Alginate to the open draining areas.  Cover with gauze/ABD pads   Secure with Coflex Lite --- Applied a Coflex TLC today at [facility]  Change dressing 3 x weekly and as needed for excessive drainage       Left Foot ulcer  Apply Silicone bordered foam dressing for protection  Change dressing 1 x weekly and as needed for drainage       Treatments completed as above at the [facility] today          May walk short distance throughout the day.    Recommend cut back on smoking each day to assist with the healing         Multi-layer compression wrap Instructions---Coflex Lite to RLE       Keep compression wrap/wraps clean and dry. If wraps are too tight and you experience numbness/tingling, call the wound center. If after hours, remove wraps or proceed to nearest E.R. and call wound center in AM.    Wrap will be changed 3 x weekly (2 times with [facility] and   Avoid prolonged standing in one place. Elevate leg(s) above the level of the heart when sitting or as much as possible.          Elastic Tubular Stocking---Spandagrip Size G to the LLE   Tubular elastic bandage: Apply from base of toes to behind the knee. Apply in AM, may remove for sleep.    Avoid prolonged standing in one place.    Elevate leg(s) above the level of the heart when sitting or as much as possible     Standing Status:   Future     Standing Expiration Date:   10/2/2024       Arabella Lackey PA-C          Portions of the record may have been created with voice recognition software. Occasional wrong word or "sound alike" substitutions may have occurred due to the inherent limitations of voice recognition software. Read the chart carefully and recognize, using context, where substitutions have occurred.

## 2023-10-02 NOTE — PATIENT INSTRUCTIONS
Orders Placed This Encounter   Procedures    Wound cleansing and dressings     Wash your hands with soap and water. Cleanse the wound with NSS or wound cleanser prior to applying a clean dressing. May wash legs after removing the wraps with mild soap and water, rinse well and pat dry       Apply Lac hydrin to the L lower leg dry areas daily   Left Leg wound healed today     Right Lower leg ulcer     Apply Silver Alginate to the open draining areas. Cover with gauze/ABD pads   Secure with Coflex Lite --- Applied a Coflex TLC today at [facility]  Change dressing 3 x weekly and as needed for excessive drainage       Left Foot ulcer  Apply Silicone bordered foam dressing for protection  Change dressing 1 x weekly and as needed for drainage       Treatments completed as above at the [facility] today          May walk short distance throughout the day. Recommend cut back on smoking each day to assist with the healing         Multi-layer compression wrap Instructions---Coflex Lite to RLE       Keep compression wrap/wraps clean and dry. If wraps are too tight and you experience numbness/tingling, call the wound center. If after hours, remove wraps or proceed to nearest E.R. and call wound center in AM.    Wrap will be changed 3 x weekly (2 times with [facility] and   Avoid prolonged standing in one place. Elevate leg(s) above the level of the heart when sitting or as much as possible. Elastic Tubular Stocking---Spandagrip Size G to the LLE   Tubular elastic bandage: Apply from base of toes to behind the knee. Apply in AM, may remove for sleep. Avoid prolonged standing in one place.     Elevate leg(s) above the level of the heart when sitting or as much as possible     Standing Status:   Future     Standing Expiration Date:   10/2/2024

## 2023-10-04 ENCOUNTER — HOME CARE VISIT (OUTPATIENT)
Dept: HOME HEALTH SERVICES | Facility: HOME HEALTHCARE | Age: 38
End: 2023-10-04
Payer: MEDICARE

## 2023-10-04 VITALS
OXYGEN SATURATION: 96 % | SYSTOLIC BLOOD PRESSURE: 142 MMHG | DIASTOLIC BLOOD PRESSURE: 80 MMHG | TEMPERATURE: 97.9 F | HEART RATE: 73 BPM

## 2023-10-04 PROCEDURE — G0299 HHS/HOSPICE OF RN EA 15 MIN: HCPCS

## 2023-10-05 ENCOUNTER — APPOINTMENT (OUTPATIENT)
Dept: RADIOLOGY | Facility: CLINIC | Age: 38
End: 2023-10-05
Payer: MEDICARE

## 2023-10-05 ENCOUNTER — OFFICE VISIT (OUTPATIENT)
Dept: PODIATRY | Facility: CLINIC | Age: 38
End: 2023-10-05
Payer: MEDICARE

## 2023-10-05 VITALS — HEART RATE: 94 BPM | DIASTOLIC BLOOD PRESSURE: 129 MMHG | SYSTOLIC BLOOD PRESSURE: 234 MMHG

## 2023-10-05 DIAGNOSIS — M19.172 POST-TRAUMATIC ARTHRITIS OF LEFT ANKLE: ICD-10-CM

## 2023-10-05 DIAGNOSIS — M21.372 LEFT FOOT DROP: ICD-10-CM

## 2023-10-05 DIAGNOSIS — M79.672 PAIN IN LEFT FOOT: ICD-10-CM

## 2023-10-05 DIAGNOSIS — G89.29 CHRONIC PAIN OF LEFT ANKLE: ICD-10-CM

## 2023-10-05 DIAGNOSIS — M25.572 CHRONIC PAIN OF LEFT ANKLE: ICD-10-CM

## 2023-10-05 DIAGNOSIS — M19.072 ARTHRITIS OF ANKLE OR FOOT, DEGENERATIVE, LEFT: ICD-10-CM

## 2023-10-05 DIAGNOSIS — M25.572 CHRONIC PAIN OF LEFT ANKLE: Primary | ICD-10-CM

## 2023-10-05 DIAGNOSIS — G89.29 CHRONIC PAIN OF LEFT ANKLE: Primary | ICD-10-CM

## 2023-10-05 PROCEDURE — 99204 OFFICE O/P NEW MOD 45 MIN: CPT | Performed by: PODIATRIST

## 2023-10-05 PROCEDURE — 73610 X-RAY EXAM OF ANKLE: CPT

## 2023-10-05 NOTE — PROGRESS NOTES
Assessment/Plan:    No problem-specific Assessment & Plan notes found for this encounter. Diagnoses and all orders for this visit:    Chronic pain of left ankle  -     X-ray ankle left 3+ views; Future  -     Cam Boot  -     Brace  -     Ambulatory referral to Physical Therapy; Future    Pain in left foot  -     Cam Boot  -     Ambulatory referral to Physical Therapy; Future    Arthritis of ankle or foot, degenerative, left  -     CT lower extremity wo contrast left; Future  -     Cam Boot  -     Brace  -     Ambulatory referral to Physical Therapy; Future    Left foot drop  -     Cam Boot  -     Brace  -     Ambulatory referral to Physical Therapy; Future    Post-traumatic arthritis of left ankle  -     CT lower extremity wo contrast left; Future  -     Cam Boot  -     Ambulatory referral to Physical Therapy; Future      - Will send for [place] brace  -We will attempt to brace her and optimize the function of her left lower extremity rather than continue her in a cam boot, but due to the very use condition of her cam boot we will dispense another one considering the broken straps, broken tried etc.  - She is to return in 6 to 8 weeks following aggressive physical therapy and also attempting to size a left [place] brace  - We also discussed that his [place] brace may not fix her dropfoot and she may need another device on top of this to help allow her to function well  - X-rays 3 views of the left ankle were reviewed do show significant pes planus with severe arthritic change of the ankle with some cystic change of the talus  - Due to severe arthritic changes found on x-ray we will send her for CT to evaluate the arthritis of the left lower extremity fully to discuss possible surgical intervention in the future    Subjective:      Patient ID: Sue Davis is a 43 y.o. female.     Patient transfer evaluation management of her left lower extremity, she has a very complicated history of multiple injuries and multiple pathologies over the past few years. She has reports of a previous ankle fracture which was not allowed to be fixed due to issues with high blood pressure, she could not obtain medical clearance. She also had a previous Achilles tendon rupture a year and 2 days ago, the ankle fracture was approximately 2-1/2 years ago. She presents today ambulating in cam boot which is her sole form of ambulation for the past 2 years. She does note continued pain discomfort swelling that gets worse when she is out of the cam boot she is proxy been out of the cam boot for total of 3 months over the past duration of 2 years. The cam boot has broken the straps of broken detritus broken the bladder is broken and is putting her at more risk rather than helping her. She has not tried an [address] and has been previously been recommended to have a ankle fusion due to the severity of the arthritis within the ankle      The following portions of the patient's history were reviewed and updated as appropriate: allergies, current medications, past family history, past medical history, past social history, past surgical history and problem list.    Review of Systems   Constitutional: Negative for chills and fever. HENT: Negative for ear pain and sore throat. Eyes: Negative for pain and visual disturbance. Respiratory: Negative for cough and shortness of breath. Cardiovascular: Negative for chest pain and palpitations. Gastrointestinal: Negative for abdominal pain and vomiting. Genitourinary: Negative for dysuria and hematuria. Musculoskeletal: Negative for arthralgias and back pain. Skin: Negative for color change and rash. Neurological: Negative for seizures and syncope. All other systems reviewed and are negative. Objective:      BP (!) 234/129 Comment: states its high because of the walk in  Pulse 94          Physical Exam  Vitals and nursing note reviewed.    Constitutional:       Appearance: Normal appearance. She is obese. HENT:      Head: Normocephalic and atraumatic. Nose: Nose normal.      Mouth/Throat:      Mouth: Mucous membranes are moist.   Eyes:      Conjunctiva/sclera: Conjunctivae normal.      Pupils: Pupils are equal, round, and reactive to light. Musculoskeletal:         General: Swelling, tenderness and deformity present. Left lower leg: Edema present. Comments: Significant lack of dorsiflexion to the LLE, minimal function of anterior compartment. Minimal ROM of the digits, mild plantrflexion of the LLF, very decreased strength in ankle, swelling, warmth. Skin:     Capillary Refill: Capillary refill takes 2 to 3 seconds. Neurological:      General: No focal deficit present. Mental Status: She is oriented to person, place, and time. Mental status is at baseline.

## 2023-10-06 ENCOUNTER — TELEPHONE (OUTPATIENT)
Age: 38
End: 2023-10-06

## 2023-10-06 ENCOUNTER — HOME CARE VISIT (OUTPATIENT)
Dept: HOME HEALTH SERVICES | Facility: HOME HEALTHCARE | Age: 38
End: 2023-10-06
Payer: MEDICARE

## 2023-10-06 NOTE — TELEPHONE ENCOUNTER
Caller: Patient    Doctor:Adonis    Reason for call: Cat scan is scheduled for 10/10 and EMG is scheduled for 10/19.       Call back#: [phone]

## 2023-10-07 ENCOUNTER — HOME CARE VISIT (OUTPATIENT)
Dept: HOME HEALTH SERVICES | Facility: HOME HEALTHCARE | Age: 38
End: 2023-10-07
Payer: MEDICARE

## 2023-10-07 VITALS
TEMPERATURE: 97.4 F | HEART RATE: 74 BPM | OXYGEN SATURATION: 95 % | RESPIRATION RATE: 18 BRPM | DIASTOLIC BLOOD PRESSURE: 82 MMHG | SYSTOLIC BLOOD PRESSURE: 152 MMHG

## 2023-10-07 PROCEDURE — G0299 HHS/HOSPICE OF RN EA 15 MIN: HCPCS

## 2023-10-09 ENCOUNTER — OFFICE VISIT (OUTPATIENT)
Dept: WOUND CARE | Facility: CLINIC | Age: 38
End: 2023-10-09
Payer: MEDICARE

## 2023-10-09 VITALS
SYSTOLIC BLOOD PRESSURE: 161 MMHG | TEMPERATURE: 96.2 F | RESPIRATION RATE: 20 BRPM | DIASTOLIC BLOOD PRESSURE: 91 MMHG | HEART RATE: 82 BPM

## 2023-10-09 DIAGNOSIS — I83.018 VENOUS STASIS ULCER OF OTHER PART OF RIGHT LOWER LEG WITH FAT LAYER EXPOSED, UNSPECIFIED WHETHER VARICOSE VEINS PRESENT (HCC): Primary | ICD-10-CM

## 2023-10-09 DIAGNOSIS — I87.2 EDEMA OF BOTH LOWER EXTREMITIES DUE TO PERIPHERAL VENOUS INSUFFICIENCY: ICD-10-CM

## 2023-10-09 DIAGNOSIS — L89.892 PRESSURE INJURY OF DORSUM OF LEFT FOOT, STAGE 2 (HCC): ICD-10-CM

## 2023-10-09 DIAGNOSIS — L97.812 VENOUS STASIS ULCER OF OTHER PART OF RIGHT LOWER LEG WITH FAT LAYER EXPOSED, UNSPECIFIED WHETHER VARICOSE VEINS PRESENT (HCC): Primary | ICD-10-CM

## 2023-10-09 PROCEDURE — 29581 APPL MULTLAYER CMPRN SYS LEG: CPT

## 2023-10-09 PROCEDURE — 99213 OFFICE O/P EST LOW 20 MIN: CPT | Performed by: STUDENT IN AN ORGANIZED HEALTH CARE EDUCATION/TRAINING PROGRAM

## 2023-10-09 NOTE — PATIENT INSTRUCTIONS
Orders Placed This Encounter   Procedures    Wound cleansing and dressings     Wash your hands with soap and water. Apply Lac hydrin to the L lower leg dry areas daily. Remove right lower leg coflex and wash right leg and ulcer with soap and water, then apply PolyMem to ulcers. Cover with gauze/ABD pads   Secure with Coflex. Change weekly at [address]. Home Health can change if compression falls down. This was done today      Left Foot ulcer     Apply Silicone bordered foam dressing for protection   Change dressing 1 x weekly and as needed for drainage. This was done today. May walk short distance throughout the day. Recommend cut back on smoking each day to assist with the healing         Multi-layer compression wrap Instructions---Coflex        Keep compression wrap/wraps clean and dry. If wraps are too tight and you experience numbness/tingling, call the [address]. If after hours, remove wraps or proceed to nearest E.R. and call  [employer] in AM.       Avoid prolonged standing in one place. Elevate leg(s) above the level of the heart when sitting or as much as possible. May use compression stocking to left lower leg-apply every morning and remove at bedtime. Please call the [phone] Monday through Friday between the hours of 8:00 AM and 4:30 PM at [phone] if you have any questions, if you experience any major changes in your wound(s) or for any signs or symptoms of infection such as fever; changes in the redness, swelling, drainage, or odor of your wound. After hours, weekends or holidays please contact your primary care physician or go to the hospital emergency room.        Standing Status:   Future     Standing Expiration Date:   10/9/2024

## 2023-10-09 NOTE — PROGRESS NOTES
Patient ID: Sue Davis is a 43 y.o. female Date of Birth 1985       Chief Complaint   Patient presents with   • Follow Up Wound Care Visit       Allergies:  Bee venom and Wasp venom    Diagnosis:   Diagnosis ICD-10-CM Associated Orders   1. Venous stasis ulcer of other part of right lower leg with fat layer exposed, unspecified whether varicose veins present ([facility])  I83.018 Wound cleansing and dressings    Q58.108 Cast Application      2. Pressure injury of dorsum of left foot, stage 2 ([facility])  L89.892 Wound cleansing and dressings     Cast Application      3. Edema of both lower extremities due to peripheral venous insufficiency  I87.2            Assessment  & Plan:    • F/u VSU of the RLE. Only one small fissure remains present with serous drainage from the site and mild maceration surrounding the point of drainage. No surrounding erythema. Dry scaling skin of the leg is improved. o Polymem to remaining open area. Continue with Coflex for compression. Change once weekly. Notify wound care center if multilayer compression wrap falls down or causes discomfort in toes. o Bring 20-30 mmHg compression stockings at the next visit. • Follow-up pressure injury of the left dorsal foot related to rubbing from ill fitting cam boot. No longer draining. Mild erythema is present from irritation of skin in area of friction however is not diffuse nor is or lymphangitic streaking to suggest acute soft tissue infection. • Continue with Allevyn bordered foam for protection. • Pt following with podiatry regarding ill fitting CAM boot. Podiatry dispensed new CAM given condition of it to wear in interim until able to obtain [facility] [address] [facility]. • F/u in one week. Instructed to call if any questions or concerns arise in meantime.             Subjective:   09/11/23: 44 y/o F with PMHx of hypothyroidism, uncontrolled DM with peripheral neuropathy, CKD, HTN, obesity, arterial insufficiency of lower extremity, hx of multiple abscesses, presents for evaluation of wounds of bilateral lower extremities and L foot. Was previously seen for wound of RLE and cellulitis at urgent care on 08/01/23 and was prescribed Clindamycin. She states following taking the antibiotic the wound and surrounding redness appeared to be improving but after the seven day course was finished she began to have surrounding redness and worsening size of the wound again. Currently band-aids are being used to the LLE and non-stick bandages to the RLE. Currently she sleeps on a couch and tries to elevate her legs as best as she can; is not currently sleeping in bed because there is not air conditioning in the room with her bed and she gets too warm. Has not been wearing compression as the compression socks are too difficult to get on and off. Sugars are uncontrolled but has recently established with new family doctor whom referred pt to endocrinology; the soonest appointment is in January for her desired office closest to home. Checks her sugars three times daily and is usually above 300s. Is not currently on a diuretic, was discontinued after she was hospitalized for acute kidney injury. Smokes about 1 ppd. Pt is currently wearing brace for stability given she has drop foot of her L foot related to herniated discs (is not able to get cleared for surgery yet given her uncontrolled BP and sugars). The boot has been rubbing on her dorsal foot which has created a wound in the past but this area is not currently draining. Denies fevers, chills. 9/18/2023: Follow-up venous stasis ulcer of the right lower extremity with resolving cellulitis. No fever or chills. Doing well with silver alginate and Coflex lite. Also had pressure injuries of the dorsum of the left foot and also of the left calf secondary to ill fitting CAM Walker which she has been using for a very long time. Patient has been treated for hypertension and is poorly controlled.   She states that it is never under control despite multiple medications. For this reason she is unable to have fusion of her left ankle. In addition, she did not take her BP medications this morning. She is asymptomatic with severely elevated blood pressure. 09/26/23: Follow-up venous stasis ulcer of the right lower extremity. Continues to do well with silver alginate and Coflex lite, however patient notes that the Coflex wrap falls down after about 2 days. Patient plans to call for a podiatry appointment in order to have cam boot evaluated. Sugars remain high. Pressure remains high. Has an upcoming appointment with her PCP regarding her sugars and blood pressure. Denies headache, blurred vision, chest pain, shortness of breath or any numbness or tingling beyond her baseline. Was able to get on the wait list for psychiatry regarding her depression post passing of her dog. 10/02/23: Patient presents for follow-up of venous stasis ulcer of the right lower extremity. Continues to do well with silver alginate and Coflex lite. Has not yet made an appointment with podiatry to assess fit of CAM boot. Patient plans to obtain her 20 to 30 mmHg compression stockings today. Offers no complaints today. 10/09/23: Pt presents for f/u of follow-up of VSU of RLE and pressure injury of L dorsal foot. Pt was able to obtain 20-30 mmHg compression stockings since prior visit. Also met with Dr. Keith Lei whom recommended an [place] brace, physical therapy and further imaging regarding the pt's drop foot.          The following portions of the patient's history were reviewed and updated as appropriate:   Patient Active Problem List   Diagnosis   • Arterial insufficiency of lower extremity (HCC)   • Class 3 severe obesity due to excess calories with serious comorbidity and body mass index (BMI) of 50.0 to 59.9 in [facility])   • Marijuana use   • Abscess of back   • Primary hypertension   • Hypothyroidism   • Type 2 diabetes mellitus with diabetic neuropathy, with long-term current use of insulin ([facility])   • Pure hypercholesterolemia   • Stage 3a chronic kidney disease ([address])   • Acute kidney injury ([address])   • Hypomagnesemia   • Hyponatremia   • Lactic acidosis   • Uncontrolled hypertension   • Venous stasis ulcer with fat layer exposed ([facility])   • Edema of both lower extremities due to peripheral venous insufficiency   • Left foot drop   • Pain in left foot   • Arthritis of ankle or foot, degenerative, left   • Post-traumatic arthritis of left ankle   • Chronic pain of left ankle     Past Medical History:   Diagnosis Date   • Abscess of left thigh    • Abscess, scalp    • Diabetes mellitus ([address])    • Diabetes mellitus, type 2 ([facility])    • Hypertension    • Hypothyroidism    • Renal disorder    • Severe obesity (BMI >= 40) ([facility])      Past Surgical History:   Procedure Laterality Date   • ABCESS DRAINAGE  12/2022    scalp and thigh   • HIP SURGERY Left     fracture- june 2008   • SHOULDER SURGERY       Family History   Problem Relation Age of Onset   • Cancer Mother    • Diabetes Mother    • Cancer Father    • Other Father         aortic insufficiency     Social History     Socioeconomic History   • Marital status: Single     Spouse name: Not on file   • Number of children: Not on file   • Years of education: Not on file   • Highest education level: Some college, no degree   Occupational History   • Not on file   Tobacco Use   • Smoking status: Every Day     Packs/day: 1.00     Years: 20.00     Total pack years: 20.00     Types: Cigarettes   • Smokeless tobacco: Never   Vaping Use   • Vaping Use: Never used   Substance and Sexual Activity   • Alcohol use: Never   • Drug use: Yes     Types: Marijuana   • Sexual activity: Not Currently   Other Topics Concern   • Not on file   Social History Narrative    Live house- with parents        Reports has disability due to drop foot- via [facility]     Social Determinants of Health     Financial Resource Strain: Not on file   Food Insecurity: No Food Insecurity (2/18/2023)    Hunger Vital Sign    • Worried About Running Out of Food in the Last Year: Never true    • Ran Out of Food in the Last Year: Never true   Transportation Needs: No Transportation Needs (2/18/2023)    PRAPARE - Transportation    • Lack of Transportation (Medical): No    • Lack of Transportation (Non-Medical): No   Physical Activity: Not on file   Stress: Not on file   Social Connections: Not on file   Intimate Partner Violence: Not on file   Housing Stability: Low Risk  (2/18/2023)    Housing Stability Vital Sign    • Unable to Pay for Housing in the Last Year: No    • Number of Places Lived in the Last Year: 1    • Unstable Housing in the Last Year: No       Current Outpatient Medications:   •  amLODIPine (NORVASC) 10 mg tablet, , Disp: , Rfl:   •  amLODIPine (NORVASC) 2.5 mg tablet, Take 2.5 mg by mouth daily Reports taking a 2.5mg tab with a 5mg tab to = 7.5 daily, Disp: , Rfl:   •  amLODIPine (NORVASC) 5 mg tablet, Take 5 mg by mouth daily Reports taking a 2.5mg tab with a 5mg tab to = 7.5 daily, Disp: , Rfl:   •  ammonium lactate (LAC-HYDRIN) 12 % lotion, Apply topically daily To dry scaling skin of legs and feet, Disp: 396 g, Rfl: 1  •  Comfort EZ Pen Needles 32G X 4 MM MISC, , Disp: , Rfl:   •  gabapentin (NEURONTIN) 600 MG tablet, 600 mg 2 (two) times a day, Disp: , Rfl: 5  •  glucose blood test strip, e11.65 (Type 2 Diabetes) Test daily before meals 3 times a day, Disp: , Rfl:   •  GNP Alcohol Swabs 70 % PADS, , Disp: , Rfl:   •  insulin aspart (NovoLOG FlexPen) 100 UNIT/ML injection pen, TAKE EXTRA INSULIN BASED ON BG RESULTS PER SCALE PROVIDED (ISF 10-80 MG/DL OPTIONS).  E11.65 (TYPE 2 DIABETES) -200 = 1 UNIT HUMALOG -250 =<MORE>, Disp: , Rfl:   •  Lancets 30G MISC, , Disp: , Rfl:   •  levothyroxine (Synthroid) 25 mcg/mL SUSP, , Disp: , Rfl:   •  metFORMIN (GLUCOPHAGE-XR) 500 mg 24 hr tablet, , Disp: , Rfl:   •  Metoprolol Tartrate 75 MG TABS, 75 mg every 12 (twelve) hours, Disp: , Rfl: 3    Review of Systems   Constitutional: Negative for chills and fever. Eyes: Negative for visual disturbance. Respiratory: Negative for shortness of breath. Cardiovascular: Positive for leg swelling. Negative for chest pain. Musculoskeletal: Positive for back pain and gait problem. Skin: Positive for wound (bilateral lower legs). Neurological: Positive for weakness (drop foot of L foot) and numbness (peripheral neuropathy of LE). Negative for headaches. Psychiatric/Behavioral:        +depressed mood         Objective:  /91   Pulse 82   Temp (!) 96.2 °F (35.7 °C)   Resp 20   Pain Score: 0-No pain     Physical Exam  Vitals reviewed. Constitutional:       General: She is not in acute distress. Appearance: She is morbidly obese. Cardiovascular:      Rate and Rhythm: Normal rate. Pulses:           Dorsalis pedis pulses are 2+ on the right side and 2+ on the left side. Posterior tibial pulses are 2+ on the right side and 2+ on the left side. Pulmonary:      Effort: Pulmonary effort is normal. No respiratory distress. Musculoskeletal:      Right lower leg: Edema present. Left lower leg: Edema present. Skin:     Findings: Wound present. No erythema. Comments: VSU of the RLE with only one small fissure remains present with serous drainage from the site and mild maceration surrounding the point of drainage. No surrounding erythema. Dry scaling skin of the leg is improved. Pressure injury on dorsal L foot is healed. No drainage from the site is present. Remains mildy erythematous d/t friction. Unchanged from prior visits. Neurological:      Mental Status: She is alert. Motor: Weakness (L leg) present. Gait: Gait abnormal (uses brace on L foot d/t foot drop). Psychiatric:         Mood and Affect: Affect is tearful. Speech: Speech is rapid and pressured. Wound 09/11/23 Venous Ulcer Leg Right; Lower (Active)   Wound Image   10/09/23 1138   Wound Description [place] 10/09/23 1138   Essie-wound Assessment Clean;Dry 10/09/23 1138   Wound Length (cm) 0.1 cm 10/09/23 1138   Wound Width (cm) 0.1 cm 10/09/23 1138   Wound Depth (cm) 0.1 cm 10/09/23 1138   Wound Surface Area (cm^2) 0.01 cm^2 10/09/23 1138   Wound Volume (cm^3) 0.001 cm^3 10/09/23 1138   Calculated Wound Volume (cm^3) 0 cm^3 10/09/23 1138   Change in Wound Size % 100 10/09/23 1138   Drainage Amount Scant 10/09/23 1138   Drainage Description Yellow 10/09/23 1138   Non-staged Wound Description Full thickness 10/02/23 1339   Treatments Cleansed 10/09/23 1138   Patient Tolerance Tolerated well 10/09/23 1138   Dressing Status Intact 10/02/23 1339       Wound 09/11/23 Pressure Injury Foot Left;Dorsal (Active)   Wound Image   10/09/23 1139   Wound Description Epithelialization 10/09/23 1139   Pressure Injury Stage 1 09/11/23 1430   Essie-wound Assessment Clean;Dry 10/09/23 1139   Wound Length (cm) 0 cm 10/09/23 1139   Wound Width (cm) 0 cm 10/09/23 1139   Wound Depth (cm) 0 cm 10/09/23 1139   Wound Surface Area (cm^2) 0 cm^2 10/09/23 1139   Wound Volume (cm^3) 0 cm^3 10/09/23 1139   Calculated Wound Volume (cm^3) 0 cm^3 10/09/23 1139   Drainage Amount Scant 10/02/23 1339   Drainage Description Serous 10/02/23 1339   Non-staged Wound Description Full thickness 10/02/23 1339   Treatments Cleansed 10/09/23 1139   Patient Tolerance Tolerated well 10/09/23 1139   Dressing Status Intact 10/02/23 1339                        Results from last 6 Months   Lab Units 09/11/23  1538   WOUND CULTURE  No growth           Wound Instructions:  Orders Placed This Encounter   Procedures   • Wound cleansing and dressings     Wash your hands with soap and water.           Apply Lac hydrin to the L lower leg dry areas daily.        Remove right lower leg coflex and wash right leg and ulcer with soap and water, then apply PolyMem to ulcers. Cover with gauze/ABD pads   Secure with Coflex. Change weekly at [address]. Home Health can change if compression falls down. This was done today      Left Foot ulcer     Apply Silicone bordered foam dressing for protection   Change dressing 1 x weekly and as needed for drainage. This was done today.      May walk short distance throughout the day.      Recommend cut back on smoking each day to assist with the healing         Multi-layer compression wrap Instructions---Coflex        Keep compression wrap/wraps clean and dry. If wraps are too tight and you experience numbness/tingling, call the [address]. If after hours, remove wraps or proceed to nearest E.R. and call  [address] in AM.       Avoid prolonged standing in one place. Elevate leg(s) above the level of the heart when sitting or as much as possible.       May use compression stocking to left lower leg-apply every morning and remove at bedtime. Please call the [phone] Monday through Friday between the hours of 8:00 AM and 4:30 PM at [phone] if you have any questions, if you experience any major changes in your wound(s) or for any signs or symptoms of infection such as fever; changes in the redness, swelling, drainage, or odor of your wound. After hours, weekends or holidays please contact your primary care physician or go to the hospital emergency room.       Standing Status:   Future     Standing Expiration Date:   04/9/5278   • Cast Application     This order was created via procedure documentation       Cally Lynn Marcelina, PA-C    Portions of the record may have been created with voice recognition software. Occasional wrong word or "sound alike" substitutions may have occurred due to the inherent limitations of voice recognition software. Read the chart carefully and recognize, using context, where substitutions have occurred.

## 2023-10-09 NOTE — PROGRESS NOTES
Cast Application    Date/Time: 10/9/2023 11:15 AM    Performed by: Crissy Garcia RN  Authorized by: Gagandeep Li PA-C  Universal Protocol:  Consent: Verbal consent obtained. Consent given by: patient  Timeout called at: 10/9/2023 11:52 AM.  Patient identity confirmed: verbally with patient      Pre-procedure details:     Sensation:  Normal  Procedure details:     Laterality:  Right    Location:  Leg    Leg:  R lower leg    Strapping: no  Cast type:  Multi-layer compression short leg    Post-procedure details:     Pain:  Improved    Sensation:  Normal    Patient tolerance of procedure: Tolerated well, no immediate complications  Comments:      Multilayer wrap procedure     Before application, ALBERT and/or TBI determined to be adequate for healing and application of compression. Lower extremity washed prior to application of compression wrap. With the foot in dorsiflexed position, coflex was applied as per physician orders without complications or complaint of pain. The procedure was tolerated well. Toes warm & pink post application. Patient provided education & reinforced to observe toes for any discoloration, swelling or tingling and instructed when to report to the  [address] or to remove compression. Wound care as per providers orders, patient tolerated well.

## 2023-10-10 ENCOUNTER — HOSPITAL ENCOUNTER (OUTPATIENT)
Dept: CT IMAGING | Facility: HOSPITAL | Age: 38
Discharge: HOME | End: 2023-10-10
Attending: PODIATRIST
Payer: MEDICARE

## 2023-10-10 DIAGNOSIS — M19.072 ARTHRITIS OF ANKLE OR FOOT, DEGENERATIVE, LEFT: ICD-10-CM

## 2023-10-10 DIAGNOSIS — M19.172 POST-TRAUMATIC ARTHRITIS OF LEFT ANKLE: ICD-10-CM

## 2023-10-10 PROCEDURE — 73700 CT LOWER EXTREMITY W/O DYE: CPT

## 2023-10-10 PROCEDURE — G1004 CDSM NDSC: HCPCS

## 2023-10-11 ENCOUNTER — HOME CARE VISIT (OUTPATIENT)
Dept: HOME HEALTH SERVICES | Facility: HOME HEALTHCARE | Age: 38
End: 2023-10-11
Payer: MEDICARE

## 2023-10-13 ENCOUNTER — HOME CARE VISIT (OUTPATIENT)
Dept: HOME HEALTH SERVICES | Facility: HOME HEALTHCARE | Age: 38
End: 2023-10-13
Payer: MEDICARE

## 2023-10-16 ENCOUNTER — OFFICE VISIT (OUTPATIENT)
Dept: WOUND CARE | Facility: CLINIC | Age: 38
End: 2023-10-16
Payer: MEDICARE

## 2023-10-16 ENCOUNTER — HOME CARE VISIT (OUTPATIENT)
Dept: HOME HEALTH SERVICES | Facility: HOME HEALTHCARE | Age: 38
End: 2023-10-16
Payer: MEDICARE

## 2023-10-16 VITALS
RESPIRATION RATE: 22 BRPM | SYSTOLIC BLOOD PRESSURE: 189 MMHG | TEMPERATURE: 96.4 F | DIASTOLIC BLOOD PRESSURE: 98 MMHG | HEART RATE: 88 BPM

## 2023-10-16 DIAGNOSIS — I83.018 VENOUS STASIS ULCER OF OTHER PART OF RIGHT LOWER LEG WITH FAT LAYER EXPOSED, UNSPECIFIED WHETHER VARICOSE VEINS PRESENT (HCC): Primary | ICD-10-CM

## 2023-10-16 DIAGNOSIS — L97.812 VENOUS STASIS ULCER OF OTHER PART OF RIGHT LOWER LEG WITH FAT LAYER EXPOSED, UNSPECIFIED WHETHER VARICOSE VEINS PRESENT (HCC): Primary | ICD-10-CM

## 2023-10-16 PROCEDURE — 99212 OFFICE O/P EST SF 10 MIN: CPT | Performed by: FAMILY MEDICINE

## 2023-10-16 NOTE — PATIENT INSTRUCTIONS
Orders Placed This Encounter   Procedures    Wound cleansing and dressings     Your wound is healed at this time. You do not need to return to  [facility]. If your wound should re-open please call  [facility] at [phone]. You can shower normally at this time. Wash with mild soap & water. Do not scrub your legs as the skin is fragile. Make sure that you moisturize your legs daily after you shower.     Apply Compression devices as instructed apply every morning & may remove at bed time     Standing Status:   Future     Standing Expiration Date:   10/16/2024

## 2023-10-16 NOTE — PROGRESS NOTES
Patient ID: Rachelle Kilpatrick is a 43 y.o. female Date of Birth 1985       Chief Complaint   Patient presents with   • Follow Up Wound Care Visit       Allergies:  Bee venom and Wasp venom    Diagnosis:      Diagnosis ICD-10-CM Associated Orders   1. Venous stasis ulcer of other part of right lower leg with fat layer exposed, unspecified whether varicose veins present ([facility])  I83.018 Wound cleansing and dressings    L97.812               Assessment & Plan:  The stasis ulcer is now closed. There is still some stasis dermatitis. Recommended to use Lac-Hydrin. Also she will be using compression stockings 20 to 30 mmHg. Discharge from wound center. Subjective:   09/11/23: 42 y/o F with PMHx of hypothyroidism, uncontrolled DM with peripheral neuropathy, CKD, HTN, obesity, arterial insufficiency of lower extremity, hx of multiple abscesses, presents for evaluation of wounds of bilateral lower extremities and L foot. Was previously seen for wound of RLE and cellulitis at urgent care on 08/01/23 and was prescribed Clindamycin. She states following taking the antibiotic the wound and surrounding redness appeared to be improving but after the seven day course was finished she began to have surrounding redness and worsening size of the wound again. Currently band-aids are being used to the LLE and non-stick bandages to the RLE. Currently she sleeps on a couch and tries to elevate her legs as best as she can; is not currently sleeping in bed because there is not air conditioning in the room with her bed and she gets too warm. Has not been wearing compression as the compression socks are too difficult to get on and off. Sugars are uncontrolled but has recently established with new family doctor whom referred pt to endocrinology; the soonest appointment is in January for her desired office closest to home. Checks her sugars three times daily and is usually above 300s.  Is not currently on a diuretic, was discontinued after she was hospitalized for acute kidney injury. Smokes about 1 ppd. Pt is currently wearing brace for stability given she has drop foot of her L foot related to herniated discs (is not able to get cleared for surgery yet given her uncontrolled BP and sugars). The boot has been rubbing on her dorsal foot which has created a wound in the past but this area is not currently draining. Denies fevers, chills. 9/18/2023: Follow-up venous stasis ulcer of the right lower extremity with resolving cellulitis. No fever or chills. Doing well with silver alginate and Coflex lite. Also had pressure injuries of the dorsum of the left foot and also of the left calf secondary to ill fitting CAM Walker which she has been using for a very long time. Patient has been treated for hypertension and is poorly controlled. She states that it is never under control despite multiple medications. For this reason she is unable to have fusion of her left ankle. In addition, she did not take her BP medications this morning. She is asymptomatic with severely elevated blood pressure. 09/26/23: Follow-up venous stasis ulcer of the right lower extremity. Continues to do well with silver alginate and Coflex lite, however patient notes that the Coflex wrap falls down after about 2 days. Patient plans to call for a podiatry appointment in order to have cam boot evaluated. Sugars remain high. Pressure remains high. Has an upcoming appointment with her PCP regarding her sugars and blood pressure. Denies headache, blurred vision, chest pain, shortness of breath or any numbness or tingling beyond her baseline. Was able to get on the wait list for psychiatry regarding her depression post passing of her dog. 10/02/23: Patient presents for follow-up of venous stasis ulcer of the right lower extremity. Continues to do well with silver alginate and Coflex lite. Has not yet made an appointment with podiatry to assess fit of CAM boot. Patient plans to obtain her 20 to 30 mmHg compression stockings today. Offers no complaints today. 10/09/23: Pt presents for f/u of follow-up of VSU of RLE and pressure injury of L dorsal foot. Pt was able to obtain 20-30 mmHg compression stockings since prior visit. Also met with Dr. Laurel Shelton whom recommended an [place] brace, physical therapy and further imaging regarding the pt's drop foot. 10/16/2023: Follow-up venous stasis ulcer of the right lower extremity. No complaints. Believes that it may be now closed.         The following portions of the patient's history were reviewed and updated as appropriate:   Patient Active Problem List   Diagnosis   • Arterial insufficiency of lower extremity (HCC)   • Class 3 severe obesity due to excess calories with serious comorbidity and body mass index (BMI) of 50.0 to 59.9 in [facility])   • Marijuana use   • Abscess of back   • Primary hypertension   • Hypothyroidism   • Type 2 diabetes mellitus with diabetic neuropathy, with long-term current use of insulin ([facility])   • Pure hypercholesterolemia   • Stage 3a chronic kidney disease ([address])   • Acute kidney injury ([address])   • Hypomagnesemia   • Hyponatremia   • Lactic acidosis   • Uncontrolled hypertension   • Venous stasis ulcer with fat layer exposed ([address])   • Edema of both lower extremities due to peripheral venous insufficiency   • Left foot drop   • Pain in left foot   • Arthritis of ankle or foot, degenerative, left   • Post-traumatic arthritis of left ankle   • Chronic pain of left ankle     Past Medical History:   Diagnosis Date   • Abscess of left thigh    • Abscess, scalp    • Diabetes mellitus ([address])    • Diabetes mellitus, type 2 ([address])    • Hypertension    • Hypothyroidism    • Renal disorder    • Severe obesity (BMI >= 40) ([address])      Past Surgical History:   Procedure Laterality Date   • ABCESS DRAINAGE  12/2022    scalp and thigh   • HIP SURGERY Left     fracture- june 2008   • SHOULDER SURGERY       Family History   Problem Relation Age of Onset   • Cancer Mother    • Diabetes Mother    • Cancer Father    • Other Father         aortic insufficiency      Social History     Socioeconomic History   • Marital status: Single     Spouse name: Not on file   • Number of children: Not on file   • Years of education: Not on file   • Highest education level: Some college, no degree   Occupational History   • Not on file   Tobacco Use   • Smoking status: Every Day     Packs/day: 1.00     Years: 20.00     Total pack years: 20.00     Types: Cigarettes   • Smokeless tobacco: Never   Vaping Use   • Vaping Use: Never used   Substance and Sexual Activity   • Alcohol use: Never   • Drug use: Yes     Types: Marijuana   • Sexual activity: Not Currently   Other Topics Concern   • Not on file   Social History Narrative    Live house- with parents        Reports has disability due to drop foot- via [facility]     Social Determinants of Health     Financial Resource Strain: Not on file   Food Insecurity: No Food Insecurity (2/18/2023)    Hunger Vital Sign    • Worried About Running Out of Food in the Last Year: Never true    • Ran Out of Food in the Last Year: Never true   Transportation Needs: No Transportation Needs (2/18/2023)    PRAPARE - Transportation    • Lack of Transportation (Medical): No    • Lack of Transportation (Non-Medical):  No   Physical Activity: Not on file   Stress: Not on file   Social Connections: Not on file   Intimate Partner Violence: Not on file   Housing Stability: Low Risk  (2/18/2023)    Housing Stability Vital Sign    • Unable to Pay for Housing in the Last Year: No    • Number of Places Lived in the Last Year: 1    • Unstable Housing in the Last Year: No        Current Outpatient Medications:   •  amLODIPine (NORVASC) 10 mg tablet, , Disp: , Rfl:   •  amLODIPine (NORVASC) 2.5 mg tablet, Take 2.5 mg by mouth daily Reports taking a 2.5mg tab with a 5mg tab to = 7.5 daily, Disp: , Rfl:   • amLODIPine (NORVASC) 5 mg tablet, Take 5 mg by mouth daily Reports taking a 2.5mg tab with a 5mg tab to = 7.5 daily, Disp: , Rfl:   •  ammonium lactate (LAC-HYDRIN) 12 % lotion, Apply topically daily To dry scaling skin of legs and feet, Disp: 396 g, Rfl: 1  •  Comfort EZ Pen Needles 32G X 4 MM MISC, , Disp: , Rfl:   •  gabapentin (NEURONTIN) 600 MG tablet, 600 mg 2 (two) times a day, Disp: , Rfl: 5  •  glucose blood test strip, e11.65 (Type 2 Diabetes) Test daily before meals 3 times a day, Disp: , Rfl:   •  GNP Alcohol Swabs 70 % PADS, , Disp: , Rfl:   •  insulin aspart (NovoLOG FlexPen) 100 UNIT/ML injection pen, TAKE EXTRA INSULIN BASED ON BG RESULTS PER SCALE PROVIDED (ISF 10-80 MG/DL OPTIONS). E11.65 (TYPE 2 DIABETES) -200 = 1 UNIT HUMALOG -250 =<MORE>, Disp: , Rfl:   •  Lancets 30G MISC, , Disp: , Rfl:   •  levothyroxine (Synthroid) 25 mcg/mL SUSP, , Disp: , Rfl:   •  metFORMIN (GLUCOPHAGE-XR) 500 mg 24 hr tablet, , Disp: , Rfl:   •  Metoprolol Tartrate 75 MG TABS, 75 mg every 12 (twelve) hours, Disp: , Rfl: 3    Review of Systems   Constitutional:  Negative for chills and fever. Eyes:  Negative for visual disturbance. Respiratory:  Negative for shortness of breath. Cardiovascular:  Positive for leg swelling. Negative for chest pain. Musculoskeletal:  Positive for back pain and gait problem. Skin:  Positive for wound (bilateral lower legs). Neurological:  Positive for weakness (drop foot of L foot) and numbness (peripheral neuropathy of LE). Negative for headaches. Psychiatric/Behavioral:          +depressed mood       Objective:  BP (!) 189/98   Pulse 88   Temp (!) 96.4 °F (35.8 °C)   Resp 22   Pain Score: 0-No pain     Physical Exam  Vitals reviewed. Constitutional:       General: She is not in acute distress. Appearance: She is morbidly obese. Cardiovascular:      Rate and Rhythm: Normal rate.       Pulses:           Dorsalis pedis pulses are 2+ on the right side and 2+ on the left side. Posterior tibial pulses are 2+ on the right side and 2+ on the left side. Pulmonary:      Effort: Pulmonary effort is normal. No respiratory distress. Musculoskeletal:      Right lower leg: Edema present. Left lower leg: Edema present. Skin:     Findings: Wound present. No erythema. Comments: VSU of the RLE is now closed. Dry scaling skin of the leg is improved. Stasis dermatitis. Pressure injury on dorsal L foot is healed. Wound not examined today because of brace and place. Neurological:      Mental Status: She is alert. Motor: Weakness (L leg) present. Gait: Gait abnormal (uses brace on L foot d/t foot drop). Psychiatric:         Mood and Affect: Affect is not tearful. Behavior: Behavior normal. Behavior is cooperative. Results from last 6 Months   Lab Units 09/11/23  1538   WOUND CULTURE  No growth         Lab Results   Component Value Date    HGBA1C 13.2 (H) 12/07/2022       Wound Instructions:  Orders Placed This Encounter   Procedures   • Wound cleansing and dressings     Your wound is healed at this time. You do not need to return to [facility]. If your wound should re-open please call [facility] at [phone]. You can shower normally at this time. Wash with mild soap & water. Do not scrub your legs as the skin is fragile. Make sure that you moisturize your legs daily after you shower. Apply Compression devices as instructed apply every morning & may remove at bed time     Standing Status:   Future     Standing Expiration Date:   10/16/2024             Ashly Roach MD, CHT, CWS    Portions of the record may have been created with voice recognition software. Occasional wrong word or "sound alike" substitutions may have occurred due to the inherent limitations of voice recognition software. Read the chart carefully and recognize, using context, where substitutions have occurred.

## 2023-10-18 ENCOUNTER — HOME CARE VISIT (OUTPATIENT)
Dept: HOME HEALTH SERVICES | Facility: HOME HEALTHCARE | Age: 38
End: 2023-10-18
Payer: MEDICARE

## 2023-10-18 PROCEDURE — G0299 HHS/HOSPICE OF RN EA 15 MIN: HCPCS

## 2023-10-19 ENCOUNTER — HOSPITAL ENCOUNTER (OUTPATIENT)
Dept: NEUROLOGY | Facility: CLINIC | Age: 38
Discharge: HOME | End: 2023-10-19

## 2023-10-19 VITALS
HEART RATE: 64 BPM | TEMPERATURE: 97.2 F | RESPIRATION RATE: 18 BRPM | SYSTOLIC BLOOD PRESSURE: 126 MMHG | DIASTOLIC BLOOD PRESSURE: 80 MMHG | OXYGEN SATURATION: 96 %

## 2023-10-19 DIAGNOSIS — M21.372 LEFT FOOT DROP: ICD-10-CM

## 2023-12-27 ENCOUNTER — OFFICE VISIT (OUTPATIENT)
Dept: PODIATRY | Facility: CLINIC | Age: 38
End: 2023-12-27
Payer: MEDICARE

## 2023-12-27 VITALS
BODY MASS INDEX: 58.54 KG/M2 | HEIGHT: 70 IN | SYSTOLIC BLOOD PRESSURE: 197 MMHG | HEART RATE: 83 BPM | DIASTOLIC BLOOD PRESSURE: 106 MMHG

## 2023-12-27 DIAGNOSIS — M25.572 CHRONIC PAIN OF LEFT ANKLE: Primary | ICD-10-CM

## 2023-12-27 DIAGNOSIS — M21.372 LEFT FOOT DROP: ICD-10-CM

## 2023-12-27 DIAGNOSIS — M19.072 ARTHRITIS OF ANKLE OR FOOT, DEGENERATIVE, LEFT: ICD-10-CM

## 2023-12-27 DIAGNOSIS — G89.29 CHRONIC PAIN OF LEFT ANKLE: Primary | ICD-10-CM

## 2023-12-27 DIAGNOSIS — M19.172 POST-TRAUMATIC ARTHRITIS OF LEFT ANKLE: ICD-10-CM

## 2023-12-27 PROCEDURE — 99213 OFFICE O/P EST LOW 20 MIN: CPT | Performed by: PODIATRIST

## 2023-12-27 NOTE — PROGRESS NOTES
Assessment/Plan:    No problem-specific Assessment & Plan notes found for this encounter.       Diagnoses and all orders for this visit:    Chronic pain of left ankle    Arthritis of ankle or foot, degenerative, left  -     Orthotics B/L  -     Ambulatory referral to Physical Therapy; Future    Left foot drop  -     Brace    Post-traumatic arthritis of left ankle      -I think that she is having some boot pain continues been using this significantly chronically  - CT report was reviewed and does show moderate arthritis of the rear foot complex with spurring of the ankle  - As she is rather young with such significant pathology will attempt to make slow and meaningful changes over time  - I previously prescribed her an [place] brace which based off of further taking her history I think that it might be a little bit too much for her  - I would prefer to have some function and continued strength then bracing her entire lower extremity at this young age  - She is never tried an anterior dorsiflexor assist AFO and she states that she is able to compensate for her dropfoot okay at this point we will try to give her stable platform ambulating with a anterior dorsiflexor assist carbon fiber dropfoot brace and also custom orthotics  - She is to return after she does have these devices for about a month for further assessment of her pain and function    Subjective:      Patient ID: Francine Perry is a 38 y.o. female.    Patient transfer evaluation management of her left foot, ambulating cam boot as her orthopedic surgeon advised her to, she does not like her dropfoot brace is rather hard and causes her pain discomfort here to review her CT        The following portions of the patient's history were reviewed and updated as appropriate: allergies, current medications, past family history, past medical history, past social history, past surgical history, and problem list.    Review of Systems   Constitutional:  Negative for chills  "and fever.   HENT:  Negative for ear pain and sore throat.    Eyes:  Negative for pain and visual disturbance.   Respiratory:  Negative for cough and shortness of breath.    Cardiovascular:  Negative for chest pain and palpitations.   Gastrointestinal:  Negative for abdominal pain and vomiting.   Genitourinary:  Negative for dysuria and hematuria.   Musculoskeletal:  Negative for arthralgias and back pain.   Skin:  Negative for color change and rash.   Neurological:  Negative for seizures and syncope.   All other systems reviewed and are negative.        Objective:      BP (!) 197/106 Comment: PT/DR aware  Pulse 83   Ht 5' 10\" (1.778 m)   BMI 58.54 kg/m²          Physical Exam  Musculoskeletal:      Comments: No change in physical exam, continue chronic issues           "

## 2024-03-27 ENCOUNTER — TELEPHONE (OUTPATIENT)
Dept: PSYCHIATRY | Facility: CLINIC | Age: 39
End: 2024-03-27

## 2024-03-27 NOTE — TELEPHONE ENCOUNTER
Contacted patient off of [facility]  to verify needs of services in attempts to offer patient an appointment at [facility] . LVM for patient to contact intake dept  in regards to wait list